# Patient Record
Sex: FEMALE | Race: WHITE | NOT HISPANIC OR LATINO | URBAN - METROPOLITAN AREA
[De-identification: names, ages, dates, MRNs, and addresses within clinical notes are randomized per-mention and may not be internally consistent; named-entity substitution may affect disease eponyms.]

---

## 2017-06-20 ENCOUNTER — INPATIENT (INPATIENT)
Facility: HOSPITAL | Age: 62
LOS: 4 days | Discharge: HOME | End: 2017-06-25
Attending: HOSPITALIST | Admitting: HOSPITALIST
Payer: COMMERCIAL

## 2017-06-20 VITALS
RESPIRATION RATE: 18 BRPM | TEMPERATURE: 98 F | HEART RATE: 72 BPM | DIASTOLIC BLOOD PRESSURE: 102 MMHG | SYSTOLIC BLOOD PRESSURE: 131 MMHG | OXYGEN SATURATION: 97 %

## 2017-06-20 DIAGNOSIS — E87.1 HYPO-OSMOLALITY AND HYPONATREMIA: ICD-10-CM

## 2017-06-20 DIAGNOSIS — E78.00 PURE HYPERCHOLESTEROLEMIA, UNSPECIFIED: ICD-10-CM

## 2017-06-20 DIAGNOSIS — Z29.9 ENCOUNTER FOR PROPHYLACTIC MEASURES, UNSPECIFIED: ICD-10-CM

## 2017-06-20 DIAGNOSIS — R60.9 EDEMA, UNSPECIFIED: ICD-10-CM

## 2017-06-20 DIAGNOSIS — R11.2 NAUSEA WITH VOMITING, UNSPECIFIED: ICD-10-CM

## 2017-06-20 DIAGNOSIS — N17.9 ACUTE KIDNEY FAILURE, UNSPECIFIED: ICD-10-CM

## 2017-06-20 LAB
ALBUMIN SERPL ELPH-MCNC: 3.4 G/DL — SIGNIFICANT CHANGE UP (ref 3.3–5)
ALP SERPL-CCNC: 63 U/L — SIGNIFICANT CHANGE UP (ref 40–120)
ALT FLD-CCNC: 57 U/L — HIGH (ref 4–33)
APPEARANCE UR: CLEAR — SIGNIFICANT CHANGE UP
AST SERPL-CCNC: 96 U/L — HIGH (ref 4–32)
B PERT DNA SPEC QL NAA+PROBE: SIGNIFICANT CHANGE UP
BASE EXCESS BLDV CALC-SCNC: -1.2 MMOL/L — SIGNIFICANT CHANGE UP
BASOPHILS # BLD AUTO: 0.03 K/UL — SIGNIFICANT CHANGE UP (ref 0–0.2)
BASOPHILS NFR BLD AUTO: 0.6 % — SIGNIFICANT CHANGE UP (ref 0–2)
BILIRUB SERPL-MCNC: 0.4 MG/DL — SIGNIFICANT CHANGE UP (ref 0.2–1.2)
BILIRUB UR-MCNC: NEGATIVE — SIGNIFICANT CHANGE UP
BLOOD GAS VENOUS - CREATININE: 1.55 MG/DL — HIGH (ref 0.5–1.3)
BLOOD UR QL VISUAL: HIGH
BUN SERPL-MCNC: 38 MG/DL — HIGH (ref 7–23)
BUN SERPL-MCNC: 40 MG/DL — HIGH (ref 7–23)
C PNEUM DNA SPEC QL NAA+PROBE: NOT DETECTED — SIGNIFICANT CHANGE UP
CALCIUM SERPL-MCNC: 8 MG/DL — LOW (ref 8.4–10.5)
CALCIUM SERPL-MCNC: 8.1 MG/DL — LOW (ref 8.4–10.5)
CHLORIDE BLDV-SCNC: 91 MMOL/L — LOW (ref 96–108)
CHLORIDE SERPL-SCNC: 87 MMOL/L — LOW (ref 98–107)
CHLORIDE SERPL-SCNC: 89 MMOL/L — LOW (ref 98–107)
CHLORIDE UR-SCNC: 11 MMOL/L — SIGNIFICANT CHANGE UP
CK MB BLD-MCNC: 3.79 NG/ML — SIGNIFICANT CHANGE UP (ref 1–4.7)
CK SERPL-CCNC: 117 U/L — SIGNIFICANT CHANGE UP (ref 25–170)
CO2 SERPL-SCNC: 19 MMOL/L — LOW (ref 22–31)
CO2 SERPL-SCNC: 21 MMOL/L — LOW (ref 22–31)
COLOR SPEC: YELLOW — SIGNIFICANT CHANGE UP
CREAT SERPL-MCNC: 1.47 MG/DL — HIGH (ref 0.5–1.3)
CREAT SERPL-MCNC: 1.51 MG/DL — HIGH (ref 0.5–1.3)
EOSINOPHIL # BLD AUTO: 0.03 K/UL — SIGNIFICANT CHANGE UP (ref 0–0.5)
EOSINOPHIL NFR BLD AUTO: 0.6 % — SIGNIFICANT CHANGE UP (ref 0–6)
FLUAV H1 2009 PAND RNA SPEC QL NAA+PROBE: NOT DETECTED — SIGNIFICANT CHANGE UP
FLUAV H1 RNA SPEC QL NAA+PROBE: NOT DETECTED — SIGNIFICANT CHANGE UP
FLUAV H3 RNA SPEC QL NAA+PROBE: NOT DETECTED — SIGNIFICANT CHANGE UP
FLUAV SUBTYP SPEC NAA+PROBE: SIGNIFICANT CHANGE UP
FLUBV RNA SPEC QL NAA+PROBE: NOT DETECTED — SIGNIFICANT CHANGE UP
GAS PNL BLDV: 121 MMOL/L — LOW (ref 136–146)
GLUCOSE BLDV-MCNC: 156 — HIGH (ref 70–99)
GLUCOSE SERPL-MCNC: 111 MG/DL — HIGH (ref 70–99)
GLUCOSE SERPL-MCNC: 156 MG/DL — HIGH (ref 70–99)
GLUCOSE UR-MCNC: NEGATIVE — SIGNIFICANT CHANGE UP
GRAN CASTS # UR COMP ASSIST: SIGNIFICANT CHANGE UP
HADV DNA SPEC QL NAA+PROBE: NOT DETECTED — SIGNIFICANT CHANGE UP
HCO3 BLDV-SCNC: 23 MMOL/L — SIGNIFICANT CHANGE UP (ref 20–27)
HCOV 229E RNA SPEC QL NAA+PROBE: NOT DETECTED — SIGNIFICANT CHANGE UP
HCOV HKU1 RNA SPEC QL NAA+PROBE: NOT DETECTED — SIGNIFICANT CHANGE UP
HCOV NL63 RNA SPEC QL NAA+PROBE: NOT DETECTED — SIGNIFICANT CHANGE UP
HCOV OC43 RNA SPEC QL NAA+PROBE: NOT DETECTED — SIGNIFICANT CHANGE UP
HCT VFR BLD CALC: 29.8 % — LOW (ref 34.5–45)
HCT VFR BLDV CALC: 32 % — LOW (ref 34.5–45)
HGB BLD-MCNC: 10.3 G/DL — LOW (ref 11.5–15.5)
HGB BLDV-MCNC: 10.3 G/DL — LOW (ref 11.5–15.5)
HMPV RNA SPEC QL NAA+PROBE: NOT DETECTED — SIGNIFICANT CHANGE UP
HPIV1 RNA SPEC QL NAA+PROBE: NOT DETECTED — SIGNIFICANT CHANGE UP
HPIV2 RNA SPEC QL NAA+PROBE: NOT DETECTED — SIGNIFICANT CHANGE UP
HPIV3 RNA SPEC QL NAA+PROBE: NOT DETECTED — SIGNIFICANT CHANGE UP
HPIV4 RNA SPEC QL NAA+PROBE: NOT DETECTED — SIGNIFICANT CHANGE UP
HYALINE CASTS # UR AUTO: SIGNIFICANT CHANGE UP (ref 0–?)
IMM GRANULOCYTES NFR BLD AUTO: 0 % — SIGNIFICANT CHANGE UP (ref 0–1.5)
KETONES UR-MCNC: NEGATIVE — SIGNIFICANT CHANGE UP
LACTATE BLDV-MCNC: 1.4 MMOL/L — SIGNIFICANT CHANGE UP (ref 0.5–2)
LEUKOCYTE ESTERASE UR-ACNC: NEGATIVE — SIGNIFICANT CHANGE UP
LYMPHOCYTES # BLD AUTO: 0.61 K/UL — LOW (ref 1–3.3)
LYMPHOCYTES # BLD AUTO: 11.3 % — LOW (ref 13–44)
M PNEUMO DNA SPEC QL NAA+PROBE: NOT DETECTED — SIGNIFICANT CHANGE UP
MCHC RBC-ENTMCNC: 30.9 PG — SIGNIFICANT CHANGE UP (ref 27–34)
MCHC RBC-ENTMCNC: 34.6 % — SIGNIFICANT CHANGE UP (ref 32–36)
MCV RBC AUTO: 89.5 FL — SIGNIFICANT CHANGE UP (ref 80–100)
MONOCYTES # BLD AUTO: 0.34 K/UL — SIGNIFICANT CHANGE UP (ref 0–0.9)
MONOCYTES NFR BLD AUTO: 6.3 % — SIGNIFICANT CHANGE UP (ref 2–14)
MUCOUS THREADS # UR AUTO: SIGNIFICANT CHANGE UP
NEUTROPHILS # BLD AUTO: 4.39 K/UL — SIGNIFICANT CHANGE UP (ref 1.8–7.4)
NEUTROPHILS NFR BLD AUTO: 81.2 % — HIGH (ref 43–77)
NITRITE UR-MCNC: NEGATIVE — SIGNIFICANT CHANGE UP
NON-SQ EPI CELLS # UR AUTO: <1 — SIGNIFICANT CHANGE UP
NT-PROBNP SERPL-SCNC: 709.8 PG/ML — SIGNIFICANT CHANGE UP
OSMOLALITY SERPL: 268 MOSMO/KG — LOW (ref 275–295)
OSMOLALITY UR: 513 MOSMO/KG — SIGNIFICANT CHANGE UP (ref 50–1200)
PCO2 BLDV: 40 MMHG — LOW (ref 41–51)
PH BLDV: 7.38 PH — SIGNIFICANT CHANGE UP (ref 7.32–7.43)
PH UR: 5.5 — SIGNIFICANT CHANGE UP (ref 4.6–8)
PLATELET # BLD AUTO: 195 K/UL — SIGNIFICANT CHANGE UP (ref 150–400)
PMV BLD: 9.2 FL — SIGNIFICANT CHANGE UP (ref 7–13)
PO2 BLDV: 39 MMHG — SIGNIFICANT CHANGE UP (ref 35–40)
POTASSIUM BLDV-SCNC: 4.8 MMOL/L — HIGH (ref 3.4–4.5)
POTASSIUM SERPL-MCNC: 5 MMOL/L — SIGNIFICANT CHANGE UP (ref 3.5–5.3)
POTASSIUM SERPL-MCNC: 5.1 MMOL/L — SIGNIFICANT CHANGE UP (ref 3.5–5.3)
POTASSIUM SERPL-SCNC: 5 MMOL/L — SIGNIFICANT CHANGE UP (ref 3.5–5.3)
POTASSIUM SERPL-SCNC: 5.1 MMOL/L — SIGNIFICANT CHANGE UP (ref 3.5–5.3)
POTASSIUM UR-SCNC: 60 MEQ/L — SIGNIFICANT CHANGE UP
PROT SERPL-MCNC: 5.9 G/DL — LOW (ref 6–8.3)
PROT UR-MCNC: 30 — HIGH
RBC # BLD: 3.33 M/UL — LOW (ref 3.8–5.2)
RBC # FLD: 12.6 % — SIGNIFICANT CHANGE UP (ref 10.3–14.5)
RBC CASTS # UR COMP ASSIST: HIGH (ref 0–?)
RSV RNA SPEC QL NAA+PROBE: NOT DETECTED — SIGNIFICANT CHANGE UP
RV+EV RNA SPEC QL NAA+PROBE: NOT DETECTED — SIGNIFICANT CHANGE UP
SAO2 % BLDV: 68.8 % — SIGNIFICANT CHANGE UP (ref 60–85)
SODIUM SERPL-SCNC: 122 MMOL/L — LOW (ref 135–145)
SODIUM SERPL-SCNC: 123 MMOL/L — LOW (ref 135–145)
SODIUM UR-SCNC: 9 MEQ/L — SIGNIFICANT CHANGE UP
SP GR SPEC: 1.02 — SIGNIFICANT CHANGE UP (ref 1–1.03)
SQUAMOUS # UR AUTO: SIGNIFICANT CHANGE UP
TROPONIN T SERPL-MCNC: < 0.06 NG/ML — SIGNIFICANT CHANGE UP (ref 0–0.06)
UROBILINOGEN FLD QL: NORMAL E.U. — SIGNIFICANT CHANGE UP (ref 0.1–0.2)
WBC # BLD: 5.4 K/UL — SIGNIFICANT CHANGE UP (ref 3.8–10.5)
WBC # FLD AUTO: 5.4 K/UL — SIGNIFICANT CHANGE UP (ref 3.8–10.5)
WBC UR QL: SIGNIFICANT CHANGE UP (ref 0–?)

## 2017-06-20 PROCEDURE — 71020: CPT | Mod: 26

## 2017-06-20 PROCEDURE — 76700 US EXAM ABDOM COMPLETE: CPT | Mod: 26

## 2017-06-20 PROCEDURE — 93010 ELECTROCARDIOGRAM REPORT: CPT

## 2017-06-20 PROCEDURE — 99254 IP/OBS CNSLTJ NEW/EST MOD 60: CPT | Mod: GC

## 2017-06-20 PROCEDURE — 99223 1ST HOSP IP/OBS HIGH 75: CPT | Mod: AI

## 2017-06-20 RX ORDER — SODIUM CHLORIDE 9 MG/ML
1000 INJECTION INTRAMUSCULAR; INTRAVENOUS; SUBCUTANEOUS
Qty: 0 | Refills: 0 | Status: DISCONTINUED | OUTPATIENT
Start: 2017-06-20 | End: 2017-06-20

## 2017-06-20 RX ORDER — HEPARIN SODIUM 5000 [USP'U]/ML
5000 INJECTION INTRAVENOUS; SUBCUTANEOUS EVERY 12 HOURS
Qty: 0 | Refills: 0 | Status: DISCONTINUED | OUTPATIENT
Start: 2017-06-20 | End: 2017-06-25

## 2017-06-20 RX ORDER — ONDANSETRON 8 MG/1
4 TABLET, FILM COATED ORAL EVERY 8 HOURS
Qty: 0 | Refills: 0 | Status: DISCONTINUED | OUTPATIENT
Start: 2017-06-20 | End: 2017-06-25

## 2017-06-20 RX ORDER — ACETAMINOPHEN 500 MG
650 TABLET ORAL EVERY 6 HOURS
Qty: 0 | Refills: 0 | Status: DISCONTINUED | OUTPATIENT
Start: 2017-06-20 | End: 2017-06-25

## 2017-06-20 RX ORDER — ONDANSETRON 8 MG/1
4 TABLET, FILM COATED ORAL ONCE
Qty: 0 | Refills: 0 | Status: COMPLETED | OUTPATIENT
Start: 2017-06-20 | End: 2017-06-20

## 2017-06-20 RX ORDER — SODIUM CHLORIDE 9 MG/ML
1000 INJECTION INTRAMUSCULAR; INTRAVENOUS; SUBCUTANEOUS ONCE
Qty: 0 | Refills: 0 | Status: COMPLETED | OUTPATIENT
Start: 2017-06-20 | End: 2017-06-20

## 2017-06-20 RX ORDER — FUROSEMIDE 40 MG
20 TABLET ORAL ONCE
Qty: 0 | Refills: 0 | Status: COMPLETED | OUTPATIENT
Start: 2017-06-20 | End: 2017-06-20

## 2017-06-20 RX ORDER — METOCLOPRAMIDE HCL 10 MG
10 TABLET ORAL ONCE
Qty: 0 | Refills: 0 | Status: COMPLETED | OUTPATIENT
Start: 2017-06-20 | End: 2017-06-20

## 2017-06-20 RX ORDER — METOCLOPRAMIDE HCL 10 MG
10 TABLET ORAL EVERY 6 HOURS
Qty: 0 | Refills: 0 | Status: DISCONTINUED | OUTPATIENT
Start: 2017-06-20 | End: 2017-06-20

## 2017-06-20 RX ADMIN — Medication 20 MILLIGRAM(S): at 19:02

## 2017-06-20 RX ADMIN — Medication 10 MILLIGRAM(S): at 11:11

## 2017-06-20 RX ADMIN — Medication 650 MILLIGRAM(S): at 19:05

## 2017-06-20 RX ADMIN — ONDANSETRON 4 MILLIGRAM(S): 8 TABLET, FILM COATED ORAL at 19:02

## 2017-06-20 RX ADMIN — HEPARIN SODIUM 5000 UNIT(S): 5000 INJECTION INTRAVENOUS; SUBCUTANEOUS at 18:07

## 2017-06-20 RX ADMIN — SODIUM CHLORIDE 75 MILLILITER(S): 9 INJECTION INTRAMUSCULAR; INTRAVENOUS; SUBCUTANEOUS at 10:07

## 2017-06-20 RX ADMIN — ONDANSETRON 4 MILLIGRAM(S): 8 TABLET, FILM COATED ORAL at 13:31

## 2017-06-20 RX ADMIN — SODIUM CHLORIDE 1000 MILLILITER(S): 9 INJECTION INTRAMUSCULAR; INTRAVENOUS; SUBCUTANEOUS at 03:03

## 2017-06-20 RX ADMIN — Medication 650 MILLIGRAM(S): at 20:00

## 2017-06-20 RX ADMIN — Medication 10 MILLIGRAM(S): at 03:03

## 2017-06-20 NOTE — H&P ADULT - ASSESSMENT
63 yo F PMH HLD (on diet control), presented for nausea and vomiting for 2 days, reported decrease urinary out put.

## 2017-06-20 NOTE — H&P ADULT - PROBLEM SELECTOR PLAN 1
UA urine lytes/osmolarity pending  recently poor oral intake, clinically favors hypotonic hyponatremia, will empirically IVF follow up repeat BMP,   CXR no pul edema, proBNP non significant according to Pt age, CHF exacerbation less likely.

## 2017-06-20 NOTE — H&P ADULT - NSHPLABSRESULTS_GEN_ALL_CORE
LABS:                        10.3   5.40  )-----------( 195      ( 20 Jun 2017 03:00 )             29.8     06-20    123<L>  |  87<L>  |  40<H>  ----------------------------<  156<H>  5.0   |  19<L>  |  1.51<H>    Ca    8.1<L>      20 Jun 2017 03:00    TPro  5.9<L>  /  Alb  3.4  /  TBili  0.4  /  DBili  x   /  AST  96<H>  /  ALT  57<H>  /  AlkPhos  63  06-20      CARDIAC MARKERS ( 20 Jun 2017 03:00 )  x     / < 0.06 ng/mL / 117 u/L / 3.79 ng/mL / x              EKG: ordered    RADIOLOGY & ADDITIONAL TESTS:    Imaging Personally Reviewed:    IMPRESSION:   cxr: Left lower lobe atelectasis and/or pneumonia

## 2017-06-20 NOTE — ED PROVIDER NOTE - MEDICAL DECISION MAKING DETAILS
63 y/o female with mult med complaints:  n/v x2, ha, dec urine output, leg swelling.  Concerning for dehydration leading to kidney injury.  Consider new chf though unlikely as clear lung exam, no ascites, no cp.  Consider hypoalbuminemia.  Obtain cbc, cmp, bnp, ce's, cxr, ekg, give ivf bolus as clinically dry, reassess. 63 y/o female with mult med complaints:  n/v x2, ha, dec urine output, leg swelling.  Concerning for dehydration leading to kidney injury.  Consider new chf though unlikely as clear lung exam, no ascites, no cp.  Consider hypoalbuminemia.  Cyanide toxicity via apricot seeds a possibility, will check lactate.  Obtain cbc, cmp, bnp, ce's, vbg, cxr, ekg, give ivf bolus as clinically dry, reassess. 63 y/o female with mult med complaints:  n/v x2, ha, dec urine output, leg swelling.  Concerning for dehydration leading to kidney injury.  Consider new chf though unlikely as clear lung exam, no ascites, no cp.  Consider hypoalbuminemia.  Cyanide toxicity via apricot seeds a possibility though unlikely given small quantity ingested, will check lactate.  Obtain cbc, cmp, bnp, ce's, vbg, cxr, ekg, give ivf bolus as clinically dry, reassess.

## 2017-06-20 NOTE — CHART NOTE - NSCHARTNOTEFT_GEN_A_CORE
D/w Toxicology regarding need for consult as pt had apricot pits 3 of them daily x 3 days.  Cyanide serum sent. Pt unlikely has cyanide poisioning as pts lactate is WNL.  No official need for toxicology consult at this time.  Will follow cyanide serum. D/w medical attending.

## 2017-06-20 NOTE — PATIENT PROFILE ADULT. - REASON FOR ADMISSION
Legs started welling Friday night, realized Monday that they were worse and nausea, referred by urgent care for possible kidney issues, R sided flank pain

## 2017-06-20 NOTE — CONSULT NOTE ADULT - ATTENDING COMMENTS
likely ATN in the setting of NSAIDs and decreased intake with subsequent Hyponatremia(multifactorial).  would limit water and hypotonic fluids to no more 1-1.5 liters. stop IVF given hypervolemic.  would give gentle diuresis

## 2017-06-20 NOTE — H&P ADULT - PROBLEM SELECTOR PLAN 6
symptomatic support, clear liquid diet, reglan and zofran PRN  IVR  trending LFT  suspect viral syndrome, RVP pending, f/u parvo virus IgM/IgG  Cyanide toxicity via apricot seeds a possibility, lactate WNL, f/u cyanide level.

## 2017-06-20 NOTE — CONSULT NOTE ADULT - SUBJECTIVE AND OBJECTIVE BOX
[facility] DIVISION OF KIDNEY DISEASES AND HYPERTENSION -- INITIAL CONSULT NOTE  --------------------------------------------------------------------------------  HPI:  A 62 year old Female with PMH HLD( not on medication) who presented with headaches, nausea and vomiting for  the last 4 days and decreased urine output. Pt reported since Saturday reported started having nausea and vomiting consequent has decreased PO intake of solids but no liquids. Pt report was able tolerate fluids but no solid. Pt report that felt sick with subjective fever and chills.Pt also reports she has some hoarseness on her voices since then.  Pt report taking motrin for her headaches the last few days.  She tried to drink more fluid, but found her legs got swollen more  and had a weight gain of 13 pounds. Pt also reported taking apricot kernels the last 3 days. In the ER labs showed hyponatremia (Na 123) with ETHEL SCr 1.5. s/p 1L NS and on the floor continued with NS 0.9  75 cc per hour with clear liquid. Serum sodium decreased to 122. Pt and nurse reports currently worsening lower extremity edema the last 24 hour.Pt still having nausea and headache but improves with antiemetic medication and Tylenol. Pt report her grandson was sick 2weeks ago. Pt reports that  had a rash that went away byitself            PAST HISTORY  --------------------------------------------------------------------------------  PAST MEDICAL & SURGICAL HISTORY:  Hypercholesteremia  No significant past surgical history    FAMILY HISTORY:  No pertinent family history in first degree relatives    PAST SOCIAL HISTORY:    ALLERGIES & MEDICATIONS  --------------------------------------------------------------------------------  Allergies    No Known Allergies    Intolerances      Standing Inpatient Medications  heparin  Injectable 5000Unit(s) SubCutaneous every 12 hours    PRN Inpatient Medications  ondansetron Injectable 4milliGRAM(s) IV Push every 8 hours PRN  acetaminophen   Tablet. 650milliGRAM(s) Oral every 6 hours PRN      REVIEW OF SYSTEMS  --------------------------------------------------------------------------------  Gen: No weight changes, fatigue, fevers/chills, weakness  Skin: No rashes  Respiratory: No dyspnea, cough, wheezing, hemoptysis  CV: No chest pain, PND, orthopnea  GI: No abdominal pain, diarrhea, constipation, nausea, vomiting, melena, hematochezia  : No increased frequency, dysuria, hematuria, nocturia  MSK: No joint pain/swelling; no back pain; no edema  Neuro: No dizziness/lightheadedness, weakness  Heme: No easy bruising or bleeding      All other systems were reviewed and are negative, except as noted.    VITALS/PHYSICAL EXAM  --------------------------------------------------------------------------------  T(C): 36.7, Max: 36.7 (06-20 @ 03:33)  HR: 65 (65 - 78)  BP: 122/70 (122/70 - 145/77)  RR: 18 (17 - 18)  SpO2: 100% (97% - 100%)  Wt(kg): --  Height (cm): 154.9 (06-20-17 @ 09:48)  Weight (kg): 63.3 (06-20-17 @ 09:48)  BMI (kg/m2): 26.4 (06-20-17 @ 09:48)  BSA (m2): 1.62 (06-20-17 @ 09:48)      I & Os for current day (as of 06-20-17 @ 19:40)  =============================================  IN: 1074 ml / OUT: 620 ml / NET: 454 ml    Physical Exam:  	Gen: NAD, well-appearing  	HEENT: PERRL, supple neck, MMM, clear oropharynx  	Pulm: Decreased breath sounds on the bases  	CV: RRR, S1S2; no rub  	Back: No spinal or CVA tenderness; no sacral edema  	Abd: +BS, soft, nontender/nondistended  	: No suprapubic tenderness  	LE: Warm,B/L LE pitting edema ++              Skin: Warm, without rashes    LABS/STUDIES  --------------------------------------------------------------------------------              10.3   5.40  >-----------<  195      [06-20-17 @ 03:00]              29.8     122  |  89  |  38  ----------------------------<  111      [06-20-17 @ 15:42]  5.1   |  21  |  1.47        Ca     8.0     [06-20-17 @ 15:42]    TPro  5.9  /  Alb  3.4  /  TBili  0.4  /  DBili  x   /  AST  96  /  ALT  57  /  AlkPhos  63  [06-20-17 @ 03:00]        Troponin < 0.06      [06-20-17 @ 03:00]        [06-20-17 @ 03:00]  Serum Osmolality 268      [06-20-17 @ 03:00]    Creatinine Trend:  SCr 1.47 [06-20 @ 15:42]  SCr 1.51 [06-20 @ 03:00]    Urinalysis - [06-20-17 @ 13:25]      Color YELLOW / Appearance CLEAR / SG 1.017 / pH 5.5      Gluc NEGATIVE / Ketone NEGATIVE  / Bili NEGATIVE / Urobili NORMAL       Blood SMALL / Protein 30 / Leuk Est NEGATIVE / Nitrite NEGATIVE      RBC 5-10 / WBC 2-5 / Hyaline 2-5 / Gran 2-5 / Sq Epi OCC / Non Sq Epi  / Bacteria     Urine Sodium 9      [06-20-17 @ 13:25]  Urine Potassium 60.0      [06-20-17 @ 13:25]  Urine Chloride 11      [06-20-17 @ 13:25]  Urine Osmolality 513      [06-20-17 @ 13:25]

## 2017-06-20 NOTE — H&P ADULT - HISTORY OF PRESENT ILLNESS
61 yo F PMH HLD (on diet control), presented for nausea and vomiting for 2 days, reported decrease urinary out put. Pt reported her grandson got "fifth disease" 2-3 weeks ago. She felt sick ever since then and it got worse. She felt febrile briefly, never took temperature, "lost voice", also c/o nausea/vominting, no appetite, decreased oral intake. She tried to drink more fluid, but found her legs got swollen, self reported "weight gain 10 lb" in the past 2 weeks. Otherwise, pt denied SOB, no CP, no cough, no abd pain, no dysuria, no palpitations, no rash, denied joint pain  In ER, Pt was found afebrile, vitals stable. Lab showed hyponatremia (Na 123) with ETHEL. s/p 1L NS 63 yo F PMH HLD (on diet control), presented for nausea and vomiting for 2 days, reported decrease urinary out put. Pt reported her grandson got "fifth disease" 2-3 weeks ago. She felt sick ever since then and it got worse. She felt febrile briefly, never took temperature, "lost voice", also c/o nausea/vominting, no appetite, decreased oral intake. She tried to drink more fluid, but found her legs got swollen, self reported "weight gain 10 lb" in the past 2 weeks. Pt reported taking apricot kernels, and concerned about cyanide poisoning. daughter reported friend had similar symptoms.  Otherwise, pt denied SOB, no CP, no cough, no abd pain, no dysuria, no palpitations, no rash, denied joint pain  In ER, Pt was found afebrile, vitals stable. Lab showed hyponatremia (Na 123) with ETHEL. s/p 1L NS 61 yo F PMH HLD (on diet control), presented for nausea and vomiting for 2 days, reported decrease urinary out put. Pt reported her grandson got "fifth disease" 2-3 weeks ago. She felt sick ever since then and it got worse. She felt febrile briefly, never took temperature, "lost voice", finished 5 days course of Abx from PCP. She also c/o nausea/vominting, no appetite, decreased oral intake. She tried to drink more fluid, but found her legs got swollen, self reported "weight gain 10 lb" in the past 2 weeks. Pt reported taking apricot kernels, and concerned about cyanide poisoning. daughter reported friend had similar symptoms.  Otherwise, pt denied SOB, no CP, no cough, no abd pain, no dysuria, no palpitations, no rash, denied joint pain  In ER, Pt was found afebrile, vitals stable. Lab showed hyponatremia (Na 123) with ETHEL. s/p 1L NS

## 2017-06-20 NOTE — ED ADULT NURSE NOTE - OBJECTIVE STATEMENT
pt arrives a&ox3 ambulatory to room 18 c/o nausea and leg swelling for past several days. denies chest pain, sob, fevers, no vomiting. nad noted. vss. resps even and unlabored. 20g iv access obtained labs drawn and sent. will ctm.

## 2017-06-20 NOTE — ED PROVIDER NOTE - OBJECTIVE STATEMENT
63 y/o female h/o hld, appy here with n/v x2 today, dec urine output x5 days, mild frontal ha similar in nature to prior mckinnon's.  Pt states that she had a viral syndrome with uri sx about 2 weeks ago, sick contact in family member with same sx.  States she has felt gradually unwell over past few days.  Feels as though she has inc leg swelling b/l as well. 63 y/o female h/o hld, appy here with n/v x2 today, dec urine output x5 days, mild frontal ha similar in nature to prior mckinnon's.  Pt states that she had a viral syndrome with uri sx about 2 weeks ago, sick contact in family member with same sx.  States she has felt gradually unwell over past few days.  Feels as though she has inc leg swelling b/l as well.  Pt admits to eating apricot seeds at the behest of a friend for health benefits.  Ate 3 kernels over 3 days about 1 week ago, concerned about adverse effects from the seeds.

## 2017-06-20 NOTE — CONSULT NOTE ADULT - ASSESSMENT
A 62 year old Female with PMH HLD( not on medication) who presented with headaches, nausea and vomiting for  the last 4 days  found to have hyponatremia and jerome.

## 2017-06-20 NOTE — CONSULT NOTE ADULT - PROBLEM SELECTOR RECOMMENDATION 2
Multifactorial: In the setting poor solute intake, hypotonic intake and stimulation of ADH (headache and N/V). Pt s/p IV fluids with worsening serum sodium and edema. Consider to hold IVF. Fluid restriction less than 1.5 L per day. Change the diet from clear liquids  to one that doesnot has less fluid as she tolerated. Management of nausea and headache as per medical team. Send U Na and Uosm  today. Check Serum Sodium every 8  hours. Goal correction is 6-8 meq in a 24 hours .           Discussed with ADS team

## 2017-06-20 NOTE — CONSULT NOTE ADULT - PROBLEM SELECTOR RECOMMENDATION 9
In the setting NSAID exposure, hypovolemia, GI losses and ? viral infection most likely hemodynamically mediated. Pt s/p IVF with minimal improvement of the serum creatinine. Pt currently had worsening lower extremity edema. Consider hold IV fluids. Most likely ATN currently. Other on the differential AIN ? from NSAID and recent exposure to Abx with a recent rash. Renal Us showed no obstructive nephropathy  Strict I and Os. Avoid nephrotoxic, NSAIDs And RCAs

## 2017-06-20 NOTE — ED ADULT TRIAGE NOTE - CHIEF COMPLAINT QUOTE
pt. c/o nausea, 2 episodes of vomiting, HA and decreased urine output x 5 days, went to urgent care, UA showed evidence of proteinuria.  Edema noted to both of pt.'s lower extremities.  PMHx HDL.

## 2017-06-20 NOTE — H&P ADULT - NSHPPHYSICALEXAM_GEN_ALL_CORE
Vital Signs Last 24 Hrs  T(C): 36.7, Max: 36.7 (06-20 @ 03:33)  HR: 78 (72 - 78)  BP: 139/89 (131/102 - 145/77)  RR: 17 (17 - 18)  SpO2: 99% (97% - 99%)  Wt(kg): --  CAPILLARY BLOOD GLUCOSE    I&O's Summary      PHYSICAL EXAM:  GENERAL: NAD, well-developed  HEAD:  Atraumatic, Normocephalic  EYES: EOMI, PERRLA, conjunctiva and sclera clear  NECK: Supple, No JVD  CHEST/LUNG: Clear to auscultation bilaterally; No wheeze  HEART: Regular rate and rhythm; No murmurs, rubs, or gallops  ABDOMEN: Soft, Nontender, Nondistended; Bowel sounds present  EXTREMITIES:  mild non-pitting edema b/l LE, no calf tenderness, no erythema or warmth, 2+ Peripheral Pulses, No clubbing, cyanosis.  PSYCH: AAOx3  NEUROLOGY: non-focal  SKIN: No rashes or lesions

## 2017-06-21 LAB
ALBUMIN SERPL ELPH-MCNC: 2.9 G/DL — LOW (ref 3.3–5)
ALP SERPL-CCNC: 59 U/L — SIGNIFICANT CHANGE UP (ref 40–120)
ALT FLD-CCNC: 40 U/L — HIGH (ref 4–33)
AST SERPL-CCNC: 46 U/L — HIGH (ref 4–32)
B19V IGG SER QL: POSITIVE — SIGNIFICANT CHANGE UP
B19V IGG SER-ACNC: 5 INDEX — HIGH (ref 0–0.8)
B19V IGM FLD-ACNC: 5.7 INDEX — HIGH (ref 0–0.8)
B19V IGM SER-ACNC: POSITIVE — SIGNIFICANT CHANGE UP
BILIRUB SERPL-MCNC: 0.4 MG/DL — SIGNIFICANT CHANGE UP (ref 0.2–1.2)
BUN SERPL-MCNC: 40 MG/DL — HIGH (ref 7–23)
BUN SERPL-MCNC: 41 MG/DL — HIGH (ref 7–23)
BUN SERPL-MCNC: 41 MG/DL — HIGH (ref 7–23)
C3 SERPL-MCNC: 48.5 MG/DL — LOW (ref 90–180)
C4 SERPL-MCNC: 1.5 MG/DL — LOW (ref 10–40)
CALCIUM SERPL-MCNC: 7.9 MG/DL — LOW (ref 8.4–10.5)
CALCIUM SERPL-MCNC: 8.1 MG/DL — LOW (ref 8.4–10.5)
CALCIUM SERPL-MCNC: 8.3 MG/DL — LOW (ref 8.4–10.5)
CHLORIDE SERPL-SCNC: 88 MMOL/L — LOW (ref 98–107)
CHLORIDE SERPL-SCNC: 89 MMOL/L — LOW (ref 98–107)
CHLORIDE SERPL-SCNC: 90 MMOL/L — LOW (ref 98–107)
CHOLEST SERPL-MCNC: 161 MG/DL — SIGNIFICANT CHANGE UP (ref 120–199)
CO2 SERPL-SCNC: 19 MMOL/L — LOW (ref 22–31)
CO2 SERPL-SCNC: 20 MMOL/L — LOW (ref 22–31)
CO2 SERPL-SCNC: 20 MMOL/L — LOW (ref 22–31)
CREAT ?TM UR-MCNC: 175.45 MG/DL — SIGNIFICANT CHANGE UP
CREAT SERPL-MCNC: 1.54 MG/DL — HIGH (ref 0.5–1.3)
CREAT SERPL-MCNC: 1.6 MG/DL — HIGH (ref 0.5–1.3)
CREAT SERPL-MCNC: 1.81 MG/DL — HIGH (ref 0.5–1.3)
GLUCOSE SERPL-MCNC: 110 MG/DL — HIGH (ref 70–99)
GLUCOSE SERPL-MCNC: 122 MG/DL — HIGH (ref 70–99)
GLUCOSE SERPL-MCNC: 96 MG/DL — SIGNIFICANT CHANGE UP (ref 70–99)
HAPTOGLOB SERPL-MCNC: 210 MG/DL — HIGH (ref 34–200)
HBA1C BLD-MCNC: 5.8 % — HIGH (ref 4–5.6)
HCT VFR BLD CALC: 27.3 % — LOW (ref 34.5–45)
HDLC SERPL-MCNC: 23 MG/DL — LOW (ref 45–65)
HGB BLD-MCNC: 9.5 G/DL — LOW (ref 11.5–15.5)
LDH SERPL L TO P-CCNC: 166 U/L — SIGNIFICANT CHANGE UP (ref 135–225)
LIPID PNL WITH DIRECT LDL SERPL: 108 MG/DL — SIGNIFICANT CHANGE UP
MCHC RBC-ENTMCNC: 30.8 PG — SIGNIFICANT CHANGE UP (ref 27–34)
MCHC RBC-ENTMCNC: 34.8 % — SIGNIFICANT CHANGE UP (ref 32–36)
MCV RBC AUTO: 88.6 FL — SIGNIFICANT CHANGE UP (ref 80–100)
PLATELET # BLD AUTO: 183 K/UL — SIGNIFICANT CHANGE UP (ref 150–400)
PMV BLD: 8.9 FL — SIGNIFICANT CHANGE UP (ref 7–13)
POTASSIUM SERPL-MCNC: 4.6 MMOL/L — SIGNIFICANT CHANGE UP (ref 3.5–5.3)
POTASSIUM SERPL-MCNC: 4.9 MMOL/L — SIGNIFICANT CHANGE UP (ref 3.5–5.3)
POTASSIUM SERPL-MCNC: 5.1 MMOL/L — SIGNIFICANT CHANGE UP (ref 3.5–5.3)
POTASSIUM SERPL-SCNC: 4.6 MMOL/L — SIGNIFICANT CHANGE UP (ref 3.5–5.3)
POTASSIUM SERPL-SCNC: 4.9 MMOL/L — SIGNIFICANT CHANGE UP (ref 3.5–5.3)
POTASSIUM SERPL-SCNC: 5.1 MMOL/L — SIGNIFICANT CHANGE UP (ref 3.5–5.3)
PROT SERPL-MCNC: 5.1 G/DL — LOW (ref 6–8.3)
PROT UR-MCNC: 20.2 MG/DL — SIGNIFICANT CHANGE UP
RBC # BLD: 3.08 M/UL — LOW (ref 3.8–5.2)
RBC # FLD: 12.6 % — SIGNIFICANT CHANGE UP (ref 10.3–14.5)
RHEUMATOID FACT SERPL-ACNC: 8.1 IU/ML — SIGNIFICANT CHANGE UP
SODIUM SERPL-SCNC: 121 MMOL/L — LOW (ref 135–145)
SODIUM SERPL-SCNC: 122 MMOL/L — LOW (ref 135–145)
SODIUM SERPL-SCNC: 123 MMOL/L — LOW (ref 135–145)
TRIGL SERPL-MCNC: 178 MG/DL — HIGH (ref 10–149)
TSH SERPL-MCNC: 2.74 UIU/ML — SIGNIFICANT CHANGE UP (ref 0.27–4.2)
WBC # BLD: 4.55 K/UL — SIGNIFICANT CHANGE UP (ref 3.8–10.5)
WBC # FLD AUTO: 4.55 K/UL — SIGNIFICANT CHANGE UP (ref 3.8–10.5)

## 2017-06-21 PROCEDURE — 70450 CT HEAD/BRAIN W/O DYE: CPT | Mod: 26

## 2017-06-21 PROCEDURE — 99233 SBSQ HOSP IP/OBS HIGH 50: CPT | Mod: GC

## 2017-06-21 PROCEDURE — 99233 SBSQ HOSP IP/OBS HIGH 50: CPT

## 2017-06-21 RX ORDER — SODIUM CHLORIDE 5 G/100ML
500 INJECTION, SOLUTION INTRAVENOUS
Qty: 0 | Refills: 0 | Status: DISCONTINUED | OUTPATIENT
Start: 2017-06-21 | End: 2017-06-22

## 2017-06-21 RX ADMIN — SODIUM CHLORIDE 50 MILLILITER(S): 5 INJECTION, SOLUTION INTRAVENOUS at 17:41

## 2017-06-21 RX ADMIN — SODIUM CHLORIDE 75 MILLILITER(S): 5 INJECTION, SOLUTION INTRAVENOUS at 23:35

## 2017-06-21 RX ADMIN — HEPARIN SODIUM 5000 UNIT(S): 5000 INJECTION INTRAVENOUS; SUBCUTANEOUS at 05:50

## 2017-06-21 RX ADMIN — Medication 650 MILLIGRAM(S): at 20:35

## 2017-06-21 RX ADMIN — Medication 650 MILLIGRAM(S): at 21:15

## 2017-06-21 NOTE — PROGRESS NOTE ADULT - PROBLEM SELECTOR PLAN 6
symptomatic support, clear liquid diet, reglan and zofran PRN  IVR  trending LFT  suspect viral syndrome, RVP pending, f/u parvo virus IgM/IgG  Cyanide toxicity via apricot seeds a possibility, lactate WNL, f/u cyanide level. heparin SQ

## 2017-06-21 NOTE — PROGRESS NOTE ADULT - SUBJECTIVE AND OBJECTIVE BOX
Patient is a 61 yo  Female who presents with a chief complaint of N/V hyponatremia    SUBJECTIVE / OVERNIGHT EVENTS:    Patient states feeling a little better, less swollen; states antiemetic help nausea and was able to tolerate a bit of chicken soup.  Had normal BM this am, no diarrhea.  No respiratory complaints.  No fever, no chills.  Patient denies h/o renal disease or other medical problems, felt sick 3 wks ago and then given azithro x 5 days.     MEDICATIONS  (STANDING):  heparin  Injectable 5000Unit(s) SubCutaneous every 12 hours    MEDICATIONS  (PRN):  ondansetron Injectable 4milliGRAM(s) IV Push every 8 hours PRN Nausea and/or Vomiting  acetaminophen   Tablet. 650milliGRAM(s) Oral every 6 hours PRN Mild Pain (1 - 3)      Vital Signs Last 24 Hrs  T(C): 36.8, Max: 36.8 (06-21 @ 13:33)  HR: 62 (60 - 69)  BP: 120/66 (110/58 - 122/70)  RR: 16 (16 - 18)  SpO2: 100% (99% - 100%)  Wt(kg): --    I&O's Summary    I & Os for current day (as of 2017 14:31)  =============================================  IN: 1224 ml / OUT: 1170 ml / NET: 54 ml      PHYSICAL EXAM:  GENERAL: NAD  HEAD:  Atraumatic, Normocephalic  EYES: sclera appear mildl icteric, EOMI, b/l subjunctional hemorrhages   NECK: Supple, No JVD  CHEST/LUNG: Clear to auscultation bilaterally; No wheeze  HEART: Regular rate and rhythm; No murmurs, rubs, or gallops  ABDOMEN: mild distention, no r/g, soft   EXTREMITIES:   wwp, trace pedal edema   PSYCH: AAOx3  NEUROLOGY: non-focal    LABS:                        10.3   5.40  )-----------( 195      ( 2017 03:00 )             29.8     06-21    123<L>  |  90<L>  |  41<H>  ----------------------------<  96  4.6   |  20<L>  |  1.81<H>    Ca    8.1<L>      2017 06:00    TPro  5.1<L>  /  Alb  2.9<L>  /  TBili  0.4  /  DBili  x   /  AST  46<H>  /  ALT  40<H>  /  AlkPhos  59  06-21      CARDIAC MARKERS ( 2017 03:00 )  x     / < 0.06 ng/mL / 117 u/L / 3.79 ng/mL / x          Urinalysis Basic - ( 2017 13:25 )    Color: YELLOW / Appearance: CLEAR / S.017 / pH: 5.5  Gluc: NEGATIVE / Ketone: NEGATIVE  / Bili: NEGATIVE / Urobili: NORMAL E.U.   Blood: SMALL / Protein: 30 / Nitrite: NEGATIVE   Leuk Esterase: NEGATIVE / RBC: 5-10 / WBC 2-5   Sq Epi: OCC / Non Sq Epi: x / Bacteria: x        Microbiology:   CHELSEA  @ 03:00  pH: 7.38/pCO2: 40/pO2: 39/HCO3: 23/lactate: 1.4      RADIOLOGY & ADDITIONAL TESTS:    Imaging Personally Reviewed:  CXR no infiltrate, abdominal US no hydro, no biliary pathology     Consultant(s) Notes Reviewed: renal     Care Discussed with Consultants/Other Providers: renal, PCP office for labs Patient is a 63 yo  Female who presents with a chief complaint of N/V hyponatremia    SUBJECTIVE / OVERNIGHT EVENTS:    Patient states feeling a little better, less swollen; states antiemetic help nausea and was able to tolerate a bit of chicken soup.  Had normal BM this am, no diarrhea.  No respiratory complaints.  No fever, no chills.  Patient denies h/o renal disease or other medical problems, felt sick 3 wks ago and then given azithro x 5 days.     MEDICATIONS  (STANDING):  heparin  Injectable 5000Unit(s) SubCutaneous every 12 hours    MEDICATIONS  (PRN):  ondansetron Injectable 4milliGRAM(s) IV Push every 8 hours PRN Nausea and/or Vomiting  acetaminophen   Tablet. 650milliGRAM(s) Oral every 6 hours PRN Mild Pain (1 - 3)      Vital Signs Last 24 Hrs  T(C): 36.8, Max: 36.8 (06-21 @ 13:33)  HR: 62 (60 - 69)  BP: 120/66 (110/58 - 122/70)  RR: 16 (16 - 18)  SpO2: 100% (99% - 100%)  Wt(kg): --    I&O's Summary    I & Os for current day (as of 2017 14:31)  =============================================  IN: 1224 ml / OUT: 1170 ml / NET: 54 ml      PHYSICAL EXAM:  GENERAL: NAD  HEAD:  Atraumatic, Normocephalic  EYES: sclera appear mildly icteric/yellow (however TB wnl), EOMI, b/l subjunctional hemorrhages   NECK: Supple, No JVD  CHEST/LUNG: Clear to auscultation bilaterally; No wheeze  HEART: Regular rate and rhythm; No murmurs, rubs, or gallops  ABDOMEN: mild distention, no r/g, soft   EXTREMITIES:   wwp, trace pedal edema   PSYCH: AAOx3  NEUROLOGY: non-focal    LABS:                        10.3   5.40  )-----------( 195      ( 2017 03:00 )             29.8     06-21    123<L>  |  90<L>  |  41<H>  ----------------------------<  96  4.6   |  20<L>  |  1.81<H>    Ca    8.1<L>      2017 06:00    TPro  5.1<L>  /  Alb  2.9<L>  /  TBili  0.4  /  DBili  x   /  AST  46<H>  /  ALT  40<H>  /  AlkPhos  59  06-      CARDIAC MARKERS ( 2017 03:00 )  x     / < 0.06 ng/mL / 117 u/L / 3.79 ng/mL / x          Urinalysis Basic - ( 2017 13:25 )    Color: YELLOW / Appearance: CLEAR / S.017 / pH: 5.5  Gluc: NEGATIVE / Ketone: NEGATIVE  / Bili: NEGATIVE / Urobili: NORMAL E.U.   Blood: SMALL / Protein: 30 / Nitrite: NEGATIVE   Leuk Esterase: NEGATIVE / RBC: 5-10 / WBC 2-5   Sq Epi: OCC / Non Sq Epi: x / Bacteria: x        Microbiology:   VBG  @ 03:00  pH: 7.38/pCO2: 40/pO2: 39/HCO3: 23/lactate: 1.4      RADIOLOGY & ADDITIONAL TESTS:    Imaging Personally Reviewed:  CXR no infiltrate, abdominal US no hydro, no biliary pathology     Consultant(s) Notes Reviewed: renal     Care Discussed with Consultants/Other Providers: renal, PCP office for labs

## 2017-06-21 NOTE — PROGRESS NOTE ADULT - ASSESSMENT
61 yo F PMH HLD presented for severe nausea/vomiting x2d with associated decreased UOP and swelling noted to be hyponatremia and in ETHEL.

## 2017-06-21 NOTE — PROGRESS NOTE ADULT - PROBLEM SELECTOR PLAN 1
unclear insult to kidney, appears to be intrinsic, no fluid or diuretic responsive, no obstruction, got labs faxed from PCP and Cr 6/2 was 0.9 with sodium of 134  -BRIDGER, DS, C3/C4, GM Ab, ANCA, hepatitis panel  -no diarrhea to suggest HUS/TTP (plt wnl), check LDH/hapto, consider heme eval  -?renal bx, will f/u with renal re: recs unclear insult to kidney, appears to be intrinsic, no fluid or diuretic responsive, no obstruction, got labs faxed from PCP and Cr 6/2 was 0.9 with sodium of 134  -BRIDGER, DS, C3/C4, GM Ab, ANCA, hepatitis panel  -no diarrhea to suggest HUS/TTP (plt wnl), check LDH/hapto, consider heme eval  -?renal bx, will f/u with renal re: recs  - 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 unclear insult to kidney, appears to be intrinsic, not fluid or diuretic responsive in regards to Cr elevation, no obstruction, got labs faxed from PCP and Cr 6/2 was 0.9 with sodium of 134  -BRIDGER, DS, C3/C4, GM Ab, ANCA, hepatitis panel  -no diarrhea to suggest HUS/TTP (plt wnl), check LDH/hapto, consider heme eval  -?renal bx, will f/u with renal re: recs  - 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

## 2017-06-21 NOTE — PROGRESS NOTE ADULT - PROBLEM SELECTOR PLAN 2
hypervolemic hyponatremia, nonoligouric renal failure  on fluid restriction hypervolemic hyponatremia, nonoligouric renal failure  on fluid restriction  f/u renal recs

## 2017-06-21 NOTE — PROGRESS NOTE ADULT - PROBLEM SELECTOR PROBLEM 6
Nausea and vomiting, intractability of vomiting not specified, unspecified vomiting type Prophylactic measure

## 2017-06-21 NOTE — CHART NOTE - NSCHARTNOTEFT_GEN_A_CORE
Repeat Na: 122 (up from 121 7 hours prior). Not overcorrecting. Will increase 1.5% from 50cc/hr to 75cc/hr and check UOsm w/ next BMP - d/w Renal Fellow on Call

## 2017-06-21 NOTE — PROGRESS NOTE ADULT - PROBLEM SELECTOR PLAN 3
on no meds not c/w, CHF, low albumin w/o nephrotic range proteinuria on UA however suspect low protein state resonsible for swelling secondary to renal failure of unclear etiology but suspect post infectious intrinsic glomerular process, CXR not c/w CHF, BNP in range  -edema improved with lasix x 1  -f/u renal recs  -f/u TTE

## 2017-06-21 NOTE — PROGRESS NOTE ADULT - PROBLEM SELECTOR PLAN 2
hypervolemic  -urine electrolytes show pre-renal numbers with slight overload on exam  -attempt diuresis with fluid restriction no edema today.

## 2017-06-21 NOTE — PROGRESS NOTE ADULT - ATTENDING COMMENTS
her edema is much better today but her AKL is worse ?diuretics related. will start 1.5% gently at 50 cc/hr and check Na in 4 hours. it should overcome the water retentive trend of the kidneys and invoke diuresis and improve hopefully sodium without adversely affecting the Kidney function. monitor respiratory status closely.   D/w primary team

## 2017-06-21 NOTE — PROGRESS NOTE ADULT - SUBJECTIVE AND OBJECTIVE BOX
[facility] DIVISION OF KIDNEY DISEASES AND HYPERTENSION -- FOLLOW UP NOTE  --------------------------------------------------------------------------------  Chief Complaint:  lower extremity edema, jerome    24 hour events/subjective:  patient reports persistent nausea, difficulty keeping down food        PAST HISTORY  --------------------------------------------------------------------------------  No significant changes to PMH, PSH, FHx, SHx, unless otherwise noted    ALLERGIES & MEDICATIONS  --------------------------------------------------------------------------------  Allergies    No Known Allergies    Intolerances      Standing Inpatient Medications  heparin  Injectable 5000Unit(s) SubCutaneous every 12 hours    PRN Inpatient Medications  ondansetron Injectable 4milliGRAM(s) IV Push every 8 hours PRN  acetaminophen   Tablet. 650milliGRAM(s) Oral every 6 hours PRN      REVIEW OF SYSTEMS  --------------------------------------------------------------------------------  +Nausea, improved LE edema    All other systems were reviewed and are negative, except as noted.    VITALS/PHYSICAL EXAM  --------------------------------------------------------------------------------  T(C): 36.7, Max: 36.7 (06-20 @ 15:13)  HR: 60 (60 - 69)  BP: 110/58 (110/58 - 122/70)  RR: 16 (16 - 18)  SpO2: 99% (99% - 100%)  Wt(kg): --  Height (cm): 154.9 (06-20-17 @ 09:48)  Weight (kg): 63.3 (06-20-17 @ 09:48)  BMI (kg/m2): 26.4 (06-20-17 @ 09:48)  BSA (m2): 1.62 (06-20-17 @ 09:48)      I & Os for current day (as of 06-21-17 @ 13:22)  =============================================  IN: 1224 ml / OUT: 1170 ml / NET: 54 ml    Physical Exam:  	Gen: NAD  	HEENT: MMM  	Pulm: left lower lobe rales  	CV: RRR, S1S2; no rub  	Back: no sacral edema  	Abd: +BS, soft, nontender  	: No suprapubic tenderness  	UE: no edema  	LE: minimal pitting edema  	Neuro: No focal deficits, intact gait  	Psych: Normal affect and mood  	Skin: Warm, without rashes    LABS/STUDIES  --------------------------------------------------------------------------------              10.3   5.40  >-----------<  195      [06-20-17 @ 03:00]              29.8     123  |  90  |  41  ----------------------------<  96      [06-21-17 @ 06:00]  4.6   |  20  |  1.81        Ca     8.1     [06-21-17 @ 06:00]    TPro  5.1  /  Alb  2.9  /  TBili  0.4  /  DBili  x   /  AST  46  /  ALT  40  /  AlkPhos  59  [06-21-17 @ 06:00]        Troponin < 0.06      [06-20-17 @ 03:00]        [06-20-17 @ 03:00]  Serum Osmolality 268      [06-20-17 @ 03:00]    Creatinine Trend:  SCr 1.81 [06-21 @ 06:00]  SCr 1.47 [06-20 @ 15:42]  SCr 1.51 [06-20 @ 03:00]    Urinalysis - [06-20-17 @ 13:25]      Color YELLOW / Appearance CLEAR / SG 1.017 / pH 5.5      Gluc NEGATIVE / Ketone NEGATIVE  / Bili NEGATIVE / Urobili NORMAL       Blood SMALL / Protein 30 / Leuk Est NEGATIVE / Nitrite NEGATIVE      RBC 5-10 / WBC 2-5 / Hyaline 2-5 / Gran 2-5 / Sq Epi OCC / Non Sq Epi  / Bacteria     Urine Sodium 9      [06-20-17 @ 13:25]  Urine Potassium 60.0      [06-20-17 @ 13:25]  Urine Chloride 11      [06-20-17 @ 13:25]  Urine Osmolality 513      [06-20-17 @ 13:25]    HbA1c 5.8      [06-21-17 @ 06:00]  TSH 2.74      [06-21-17 @ 06:00]  Lipid: chol 161, , HDL 23,       [06-21-17 @ 06:00] [facility] DIVISION OF KIDNEY DISEASES AND HYPERTENSION -- FOLLOW UP NOTE  --------------------------------------------------------------------------------  Chief Complaint:  lower extremity edema, jerome    24 hour events/subjective:  patient reports persistent nausea, difficulty keeping down food. less swelling        PAST HISTORY  --------------------------------------------------------------------------------  No significant changes to PMH, PSH, FHx, SHx, unless otherwise noted    ALLERGIES & MEDICATIONS  --------------------------------------------------------------------------------  Allergies    No Known Allergies    Intolerances      Standing Inpatient Medications  heparin  Injectable 5000Unit(s) SubCutaneous every 12 hours    PRN Inpatient Medications  ondansetron Injectable 4milliGRAM(s) IV Push every 8 hours PRN  acetaminophen   Tablet. 650milliGRAM(s) Oral every 6 hours PRN      REVIEW OF SYSTEMS  --------------------------------------------------------------------------------  +Nausea, improved LE edema    All other systems were reviewed and are negative, except as noted.    VITALS/PHYSICAL EXAM  --------------------------------------------------------------------------------  T(C): 36.7, Max: 36.7 (06-20 @ 15:13)  HR: 60 (60 - 69)  BP: 110/58 (110/58 - 122/70)  RR: 16 (16 - 18)  SpO2: 99% (99% - 100%)  Wt(kg): --  Height (cm): 154.9 (06-20-17 @ 09:48)  Weight (kg): 63.3 (06-20-17 @ 09:48)  BMI (kg/m2): 26.4 (06-20-17 @ 09:48)  BSA (m2): 1.62 (06-20-17 @ 09:48)      I & Os for current day (as of 06-21-17 @ 13:22)  =============================================  IN: 1224 ml / OUT: 1170 ml / NET: 54 ml    Physical Exam:  	Gen: NAD  	HEENT: MMM  	Pulm: left lower lobe rales  	CV: RRR, S1S2; no rub  	Back: no sacral edema  	Abd: +BS, soft, nontender  	: No suprapubic tenderness  	UE: no edema  	LE: minimal pitting edema  	Neuro: No focal deficits, intact gait  	Psych: Normal affect and mood  	Skin: Warm, without rashes    LABS/STUDIES  --------------------------------------------------------------------------------              10.3   5.40  >-----------<  195      [06-20-17 @ 03:00]              29.8     123  |  90  |  41  ----------------------------<  96      [06-21-17 @ 06:00]  4.6   |  20  |  1.81        Ca     8.1     [06-21-17 @ 06:00]    TPro  5.1  /  Alb  2.9  /  TBili  0.4  /  DBili  x   /  AST  46  /  ALT  40  /  AlkPhos  59  [06-21-17 @ 06:00]        Troponin < 0.06      [06-20-17 @ 03:00]        [06-20-17 @ 03:00]  Serum Osmolality 268      [06-20-17 @ 03:00]    Creatinine Trend:  SCr 1.81 [06-21 @ 06:00]  SCr 1.47 [06-20 @ 15:42]  SCr 1.51 [06-20 @ 03:00]    Urinalysis - [06-20-17 @ 13:25]      Color YELLOW / Appearance CLEAR / SG 1.017 / pH 5.5      Gluc NEGATIVE / Ketone NEGATIVE  / Bili NEGATIVE / Urobili NORMAL       Blood SMALL / Protein 30 / Leuk Est NEGATIVE / Nitrite NEGATIVE      RBC 5-10 / WBC 2-5 / Hyaline 2-5 / Gran 2-5 / Sq Epi OCC / Non Sq Epi  / Bacteria     Urine Sodium 9      [06-20-17 @ 13:25]  Urine Potassium 60.0      [06-20-17 @ 13:25]  Urine Chloride 11      [06-20-17 @ 13:25]  Urine Osmolality 513      [06-20-17 @ 13:25]    HbA1c 5.8      [06-21-17 @ 06:00]  TSH 2.74      [06-21-17 @ 06:00]  Lipid: chol 161, , HDL 23,       [06-21-17 @ 06:00]

## 2017-06-21 NOTE — PROGRESS NOTE ADULT - PROBLEM SELECTOR PLAN 4
not c/w, CHF, low albumin w/o nephrotic range proteinuria on UA   -improved with lasix x 1  -f/u renal recs  -f/u TTE Improved with zofran, no vomiting x 24 hour; abdominal US neg   f/u cyanide level  check CTH to r/o central cause for hypoNa given n/v/dizziness

## 2017-06-21 NOTE — PROGRESS NOTE ADULT - PROBLEM SELECTOR PROBLEM 4
Edema, unspecified type Nausea and vomiting, intractability of vomiting not specified, unspecified vomiting type

## 2017-06-22 DIAGNOSIS — D64.9 ANEMIA, UNSPECIFIED: ICD-10-CM

## 2017-06-22 LAB
ALBUMIN SERPL ELPH-MCNC: 2.8 G/DL — LOW (ref 3.3–5)
ALP SERPL-CCNC: 56 U/L — SIGNIFICANT CHANGE UP (ref 40–120)
ALT FLD-CCNC: 24 U/L — SIGNIFICANT CHANGE UP (ref 4–33)
ANA TITR SER: NEGATIVE — SIGNIFICANT CHANGE UP
APPEARANCE UR: CLEAR — SIGNIFICANT CHANGE UP
AST SERPL-CCNC: 25 U/L — SIGNIFICANT CHANGE UP (ref 4–32)
BACTERIA # UR AUTO: SIGNIFICANT CHANGE UP
BILIRUB SERPL-MCNC: 0.4 MG/DL — SIGNIFICANT CHANGE UP (ref 0.2–1.2)
BILIRUB UR-MCNC: NEGATIVE — SIGNIFICANT CHANGE UP
BLOOD UR QL VISUAL: HIGH
BUN SERPL-MCNC: 39 MG/DL — HIGH (ref 7–23)
BUN SERPL-MCNC: 40 MG/DL — HIGH (ref 7–23)
BUN SERPL-MCNC: 42 MG/DL — HIGH (ref 7–23)
BUN SERPL-MCNC: 45 MG/DL — HIGH (ref 7–23)
C-ANCA SER-ACNC: NEGATIVE — SIGNIFICANT CHANGE UP
CALCIUM SERPL-MCNC: 7.7 MG/DL — LOW (ref 8.4–10.5)
CALCIUM SERPL-MCNC: 8.1 MG/DL — LOW (ref 8.4–10.5)
CALCIUM SERPL-MCNC: 8.1 MG/DL — LOW (ref 8.4–10.5)
CALCIUM SERPL-MCNC: 8.2 MG/DL — LOW (ref 8.4–10.5)
CHLORIDE SERPL-SCNC: 91 MMOL/L — LOW (ref 98–107)
CHLORIDE SERPL-SCNC: 92 MMOL/L — LOW (ref 98–107)
CHLORIDE SERPL-SCNC: 95 MMOL/L — LOW (ref 98–107)
CHLORIDE SERPL-SCNC: 96 MMOL/L — LOW (ref 98–107)
CO2 SERPL-SCNC: 18 MMOL/L — LOW (ref 22–31)
CO2 SERPL-SCNC: 20 MMOL/L — LOW (ref 22–31)
COLOR SPEC: SIGNIFICANT CHANGE UP
CREAT ?TM UR-MCNC: 96.5 MG/DL — SIGNIFICANT CHANGE UP
CREAT SERPL-MCNC: 1.44 MG/DL — HIGH (ref 0.5–1.3)
CREAT SERPL-MCNC: 1.46 MG/DL — HIGH (ref 0.5–1.3)
CREAT SERPL-MCNC: 1.52 MG/DL — HIGH (ref 0.5–1.3)
CREAT SERPL-MCNC: 1.6 MG/DL — HIGH (ref 0.5–1.3)
CYANIDE SERPL-MCNC: <0.1 MG/L — SIGNIFICANT CHANGE UP
DSDNA AB SER-ACNC: <12 IU/ML — SIGNIFICANT CHANGE UP
FERRITIN SERPL-MCNC: 165.6 NG/ML — HIGH (ref 15–150)
GBM IGG SER-ACNC: <0.2 U — SIGNIFICANT CHANGE UP
GLUCOSE SERPL-MCNC: 108 MG/DL — HIGH (ref 70–99)
GLUCOSE SERPL-MCNC: 112 MG/DL — HIGH (ref 70–99)
GLUCOSE SERPL-MCNC: 123 MG/DL — HIGH (ref 70–99)
GLUCOSE SERPL-MCNC: 99 MG/DL — SIGNIFICANT CHANGE UP (ref 70–99)
GLUCOSE UR-MCNC: NEGATIVE — SIGNIFICANT CHANGE UP
HAV IGM SER-ACNC: NONREACTIVE — SIGNIFICANT CHANGE UP
HBV CORE IGM SER-ACNC: NONREACTIVE — SIGNIFICANT CHANGE UP
HBV SURFACE AG SER-ACNC: NONREACTIVE — SIGNIFICANT CHANGE UP
HCT VFR BLD CALC: 26.5 % — LOW (ref 34.5–45)
HCV AB S/CO SERPL IA: 0.18 S/CO — SIGNIFICANT CHANGE UP
HCV AB SERPL-IMP: SIGNIFICANT CHANGE UP
HGB BLD-MCNC: 9.2 G/DL — LOW (ref 11.5–15.5)
HYALINE CASTS # UR AUTO: SIGNIFICANT CHANGE UP (ref 0–?)
IRON SATN MFR SERPL: 197 UG/DL — SIGNIFICANT CHANGE UP (ref 140–530)
IRON SATN MFR SERPL: 36 UG/DL — SIGNIFICANT CHANGE UP (ref 30–160)
KETONES UR-MCNC: NEGATIVE — SIGNIFICANT CHANGE UP
LEUKOCYTE ESTERASE UR-ACNC: NEGATIVE — SIGNIFICANT CHANGE UP
MAGNESIUM SERPL-MCNC: 2.1 MG/DL — SIGNIFICANT CHANGE UP (ref 1.6–2.6)
MCHC RBC-ENTMCNC: 30.7 PG — SIGNIFICANT CHANGE UP (ref 27–34)
MCHC RBC-ENTMCNC: 34.7 % — SIGNIFICANT CHANGE UP (ref 32–36)
MCV RBC AUTO: 88.3 FL — SIGNIFICANT CHANGE UP (ref 80–100)
MUCOUS THREADS # UR AUTO: SIGNIFICANT CHANGE UP
NITRITE UR-MCNC: NEGATIVE — SIGNIFICANT CHANGE UP
NON-SQ EPI CELLS # UR AUTO: <1 — SIGNIFICANT CHANGE UP
OSMOLALITY UR: 376 MOSMO/KG — SIGNIFICANT CHANGE UP (ref 50–1200)
P-ANCA SER-ACNC: NEGATIVE — SIGNIFICANT CHANGE UP
PH UR: 5.5 — SIGNIFICANT CHANGE UP (ref 4.6–8)
PHOSPHATE SERPL-MCNC: 3.3 MG/DL — SIGNIFICANT CHANGE UP (ref 2.5–4.5)
PLATELET # BLD AUTO: 177 K/UL — SIGNIFICANT CHANGE UP (ref 150–400)
PMV BLD: 8.9 FL — SIGNIFICANT CHANGE UP (ref 7–13)
POTASSIUM SERPL-MCNC: 4.7 MMOL/L — SIGNIFICANT CHANGE UP (ref 3.5–5.3)
POTASSIUM SERPL-MCNC: 5 MMOL/L — SIGNIFICANT CHANGE UP (ref 3.5–5.3)
POTASSIUM SERPL-MCNC: 5.3 MMOL/L — SIGNIFICANT CHANGE UP (ref 3.5–5.3)
POTASSIUM SERPL-MCNC: 5.4 MMOL/L — HIGH (ref 3.5–5.3)
POTASSIUM SERPL-SCNC: 4.7 MMOL/L — SIGNIFICANT CHANGE UP (ref 3.5–5.3)
POTASSIUM SERPL-SCNC: 5 MMOL/L — SIGNIFICANT CHANGE UP (ref 3.5–5.3)
POTASSIUM SERPL-SCNC: 5.3 MMOL/L — SIGNIFICANT CHANGE UP (ref 3.5–5.3)
POTASSIUM SERPL-SCNC: 5.4 MMOL/L — HIGH (ref 3.5–5.3)
PROT SERPL-MCNC: 5 G/DL — LOW (ref 6–8.3)
PROT UR-MCNC: 15 MG/DL — SIGNIFICANT CHANGE UP
PROT UR-MCNC: 20 — SIGNIFICANT CHANGE UP
RBC # BLD: 3 M/UL — LOW (ref 3.8–5.2)
RBC # FLD: 12.6 % — SIGNIFICANT CHANGE UP (ref 10.3–14.5)
RBC CASTS # UR COMP ASSIST: SIGNIFICANT CHANGE UP (ref 0–?)
RETICS #: 55.8 10X3/UL — SIGNIFICANT CHANGE UP (ref 17–73)
RETICS/RBC NFR: 1.9 % — SIGNIFICANT CHANGE UP (ref 0.5–2.5)
SODIUM SERPL-SCNC: 124 MMOL/L — LOW (ref 135–145)
SODIUM SERPL-SCNC: 125 MMOL/L — LOW (ref 135–145)
SODIUM SERPL-SCNC: 126 MMOL/L — LOW (ref 135–145)
SODIUM SERPL-SCNC: 128 MMOL/L — LOW (ref 135–145)
SP GR SPEC: 1.01 — SIGNIFICANT CHANGE UP (ref 1–1.03)
SQUAMOUS # UR AUTO: SIGNIFICANT CHANGE UP
UIBC SERPL-MCNC: 161 UG/DL — SIGNIFICANT CHANGE UP (ref 110–370)
UROBILINOGEN FLD QL: NORMAL E.U. — SIGNIFICANT CHANGE UP (ref 0.1–0.2)
WBC # BLD: 4.42 K/UL — SIGNIFICANT CHANGE UP (ref 3.8–10.5)
WBC # FLD AUTO: 4.42 K/UL — SIGNIFICANT CHANGE UP (ref 3.8–10.5)
WBC UR QL: SIGNIFICANT CHANGE UP (ref 0–?)

## 2017-06-22 PROCEDURE — 99232 SBSQ HOSP IP/OBS MODERATE 35: CPT | Mod: GC

## 2017-06-22 PROCEDURE — 99233 SBSQ HOSP IP/OBS HIGH 50: CPT

## 2017-06-22 RX ORDER — SODIUM POLYSTYRENE SULFONATE 4.1 MEQ/G
15 POWDER, FOR SUSPENSION ORAL ONCE
Qty: 0 | Refills: 0 | Status: COMPLETED | OUTPATIENT
Start: 2017-06-22 | End: 2017-06-22

## 2017-06-22 RX ADMIN — Medication 650 MILLIGRAM(S): at 23:00

## 2017-06-22 RX ADMIN — Medication 650 MILLIGRAM(S): at 22:16

## 2017-06-22 RX ADMIN — SODIUM CHLORIDE 75 MILLILITER(S): 5 INJECTION, SOLUTION INTRAVENOUS at 09:26

## 2017-06-22 RX ADMIN — SODIUM POLYSTYRENE SULFONATE 15 GRAM(S): 4.1 POWDER, FOR SUSPENSION ORAL at 17:11

## 2017-06-22 NOTE — PROGRESS NOTE ADULT - PROBLEM SELECTOR PROBLEM 5
Hypercholesteremia Nausea and vomiting, intractability of vomiting not specified, unspecified vomiting type

## 2017-06-22 NOTE — PROGRESS NOTE ADULT - PROBLEM SELECTOR PLAN 1
-creatinine stable with low rate IV hydration with 1.5% saline  -urine electrolytes suggestive of pre-renal etiology despite history of severe lower extremity edema.  Low oncotic state is a possible contributer, but proteinuria on U/A is minimal and not consistent with acute nephrotic syndrome.  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

## 2017-06-22 NOTE — PROGRESS NOTE ADULT - PROBLEM SELECTOR PROBLEM 4
Nausea and vomiting, intractability of vomiting not specified, unspecified vomiting type Anemia, unspecified type

## 2017-06-22 NOTE — PROGRESS NOTE ADULT - SUBJECTIVE AND OBJECTIVE BOX
[facility] DIVISION OF KIDNEY DISEASES AND HYPERTENSION -- FOLLOW UP NOTE  --------------------------------------------------------------------------------  Chief Complaint:  lower extremity edema    24 hour events/subjective:  patient on 1.5% hypertonic saline, denies any shortness of breath or worsening LE edema        PAST HISTORY  --------------------------------------------------------------------------------  No significant changes to PMH, PSH, FHx, SHx, unless otherwise noted    ALLERGIES & MEDICATIONS  --------------------------------------------------------------------------------  Allergies    No Known Allergies    Intolerances      Standing Inpatient Medications  heparin  Injectable 5000Unit(s) SubCutaneous every 12 hours  sodium chloride 1.5%. 500milliLiter(s) IV Continuous <Continuous>    PRN Inpatient Medications  ondansetron Injectable 4milliGRAM(s) IV Push every 8 hours PRN  acetaminophen   Tablet. 650milliGRAM(s) Oral every 6 hours PRN      REVIEW OF SYSTEMS  --------------------------------------------------------------------------------  No complaints    All other systems were reviewed and are negative, except as noted.    VITALS/PHYSICAL EXAM  --------------------------------------------------------------------------------  T(C): 37, Max: 37 (06-22 @ 05:40)  HR: 63 (62 - 65)  BP: 120/78 (120/66 - 121/68)  RR: 16 (16 - 17)  SpO2: 97% (97% - 100%)  Wt(kg): --      I & Os for 24h ending 06-22-17 @ 07:00  =============================================  IN: 800 ml / OUT: 800 ml / NET: 0 ml    I & Os for current day (as of 06-22-17 @ 10:21)  =============================================  IN: 0 ml / OUT: 150 ml / NET: -150 ml    Physical Exam:  	Gen: NAD, well-appearing  	HEENT: MMM  	Pulm: faint basilar rales  	CV: RRR, S1S2; no rub  	Abd: +BS, soft, nontender  	: No suprapubic tenderness  	UE: no edema  	LE: mild pitting edema bilaterally  	Neuro: No focal deficits  	Psych: Normal affect and mood  	Skin: Warm, without rashes    LABS/STUDIES  --------------------------------------------------------------------------------              9.2    4.42  >-----------<  177      [06-22-17 @ 04:00]              26.5     124  |  91  |  40  ----------------------------<  99      [06-22-17 @ 04:00]  5.0   |  18  |  1.52        Ca     7.7     [06-22-17 @ 04:00]      Mg     2.1     [06-22-17 @ 04:00]      Phos  3.3     [06-22-17 @ 04:00]    TPro  5.0  /  Alb  2.8  /  TBili  0.4  /  DBili  x   /  AST  25  /  ALT  24  /  AlkPhos  56  [06-22-17 @ 04:00]              [06-21-17 @ 15:19]    Creatinine Trend:  SCr 1.52 [06-22 @ 04:00]  SCr 1.54 [06-21 @ 22:10]  SCr 1.60 [06-21 @ 15:19]  SCr 1.81 [06-21 @ 06:00]  SCr 1.47 [06-20 @ 15:42] [facility] DIVISION OF KIDNEY DISEASES AND HYPERTENSION -- FOLLOW UP NOTE  --------------------------------------------------------------------------------  Chief Complaint:  lower extremity edema    24 hour events/subjective:  patient on 1.5% hypertonic saline, denies any shortness of breath or worsening LE edema        PAST HISTORY  --------------------------------------------------------------------------------  No significant changes to PMH, PSH, FHx, SHx, unless otherwise noted    ALLERGIES & MEDICATIONS  --------------------------------------------------------------------------------  Allergies    No Known Allergies    Intolerances      Standing Inpatient Medications  heparin  Injectable 5000Unit(s) SubCutaneous every 12 hours  sodium chloride 1.5%. 500milliLiter(s) IV Continuous <Continuous>    PRN Inpatient Medications  ondansetron Injectable 4milliGRAM(s) IV Push every 8 hours PRN  acetaminophen   Tablet. 650milliGRAM(s) Oral every 6 hours PRN      REVIEW OF SYSTEMS  --------------------------------------------------------------------------------  No SOB/CP/abdo pain.     All other systems were reviewed and are negative, except as noted.    VITALS/PHYSICAL EXAM  --------------------------------------------------------------------------------  T(C): 37, Max: 37 (06-22 @ 05:40)  HR: 63 (62 - 65)  BP: 120/78 (120/66 - 121/68)  RR: 16 (16 - 17)  SpO2: 97% (97% - 100%)  Wt(kg): --      I & Os for 24h ending 06-22-17 @ 07:00  =============================================  IN: 800 ml / OUT: 800 ml / NET: 0 ml    I & Os for current day (as of 06-22-17 @ 10:21)  =============================================  IN: 0 ml / OUT: 150 ml / NET: -150 ml    Physical Exam:  	Gen: NAD, well-appearing  	HEENT: MMM  	Pulm: faint basilar rales  	CV: RRR, S1S2; no rub  	Abd: +BS, soft, nontender  	: No suprapubic tenderness  	UE: no edema  	LE: mild pitting edema bilaterally  	Neuro: No focal deficits  	Psych: Normal affect and mood  	Skin: Warm, without rashes    LABS/STUDIES  --------------------------------------------------------------------------------              9.2    4.42  >-----------<  177      [06-22-17 @ 04:00]              26.5     124  |  91  |  40  ----------------------------<  99      [06-22-17 @ 04:00]  5.0   |  18  |  1.52        Ca     7.7     [06-22-17 @ 04:00]      Mg     2.1     [06-22-17 @ 04:00]      Phos  3.3     [06-22-17 @ 04:00]    TPro  5.0  /  Alb  2.8  /  TBili  0.4  /  DBili  x   /  AST  25  /  ALT  24  /  AlkPhos  56  [06-22-17 @ 04:00]              [06-21-17 @ 15:19]    Creatinine Trend:  SCr 1.52 [06-22 @ 04:00]  SCr 1.54 [06-21 @ 22:10]  SCr 1.60 [06-21 @ 15:19]  SCr 1.81 [06-21 @ 06:00]  SCr 1.47 [06-20 @ 15:42]

## 2017-06-22 NOTE — PROGRESS NOTE ADULT - PROBLEM SELECTOR PLAN 4
Improved with zofran, no vomiting x 24 hour; abdominal US neg; CTH negative for central cause of n/hypoNa   f/u cyanide level stable, no evidence of hemolysis, baseline H/H wnl per outside labs, suspect 2/2 parvo  -labs c/w AOCD  -f/u retic count

## 2017-06-22 NOTE — PROGRESS NOTE ADULT - PROBLEM SELECTOR PLAN 3
not c/w CHF, low albumin w/o nephrotic range proteinuria on UA pr Spot Protein/Cr however suspect low protein state resonsible for swelling secondary to renal failure of unclear etiology but suspect post infectious intrinsic glomerular process, CXR not c/w CHF, BNP in range  -edema improved with lasix x 1  -f/u renal recs  -f/u TTE, parvo can cause myopathy not c/w CHF, low albumin w/o nephrotic range proteinuria on UA pr Spot Protein/Cr however suspect low protein state resonsible for swelling secondary to renal failure of unclear etiology but suspect post infectious intrinsic glomerular process, CXR not c/w CHF, BNP in range  -edema improved with lasix x 1  -f/u renal recs  -f/u TTE, parvo can cause myopathy  -daily weights

## 2017-06-22 NOTE — PROGRESS NOTE ADULT - ASSESSMENT
A 62 year old Female with PMH HLD( not on medication) who presented with headaches, nausea, acute onset lower extremity edema for  the last 4 days  found to have hyponatremia and jerome.

## 2017-06-22 NOTE — PROGRESS NOTE ADULT - ASSESSMENT
63 yo F PMH HLD presented for severe nausea/vomiting x2d with associated decreased UOP and swelling noted to be hyponatremia and in ETHEL.

## 2017-06-22 NOTE — PROGRESS NOTE ADULT - SUBJECTIVE AND OBJECTIVE BOX
Patient is a 61 yo Female who presents with a chief complaint of N/V hyponatremia, ETHEL      SUBJECTIVE / OVERNIGHT EVENTS:    Patient reports feeling better today than yesterday, HA resolved  No nausea vomiting, tolerated limited PO but appetite a bit better.  Patient is urinating well,  still feels bloated but improved from admission, reports baseline weight is 126.  Had normal BM.    Per renal recommendations 1.5% NS started with mild improvement in Na 122->124.  Cr about stable ~1.5.  CTH performed given n/v/HA and hyponatremia negative for abnormality.  C3/C4 very low.  H/H stable w/o evidence of hemolysis.     MEDICATIONS  (STANDING):  heparin  Injectable 5000Unit(s) SubCutaneous every 12 hours  sodium chloride 1.5%. 500milliLiter(s) IV Continuous <Continuous>    MEDICATIONS  (PRN):  ondansetron Injectable 4milliGRAM(s) IV Push every 8 hours PRN Nausea and/or Vomiting  acetaminophen   Tablet. 650milliGRAM(s) Oral every 6 hours PRN Mild Pain (1 - 3)      Vital Signs Last 24 Hrs  T(C): 37, Max: 37 (06-22 @ 05:40)  HR: 63 (62 - 65)  BP: 120/78 (120/66 - 121/68)  RR: 16 (16 - 17)  SpO2: 97% (97% - 100%)  Wt(kg): --    CAPILLARY BLOOD GLUCOSE      I&O's Summary    I & Os for current day (as of 2017 10:07)  =============================================  IN: 800 ml / OUT: 800 ml / NET: 0 ml      PHYSICAL EXAM:  GENERAL: NAD  HEAD:  Atraumatic, Normocephalic  EYES: sclera appear mildly icteric/yellow (however TB wnl), EOMI, b/l subjunctional hemorrhages   NECK: Supple, No JVD  CHEST/LUNG: Clear to auscultation bilaterally; No wheeze, decreased BS at bases   HEART: Regular rate and rhythm; No murmurs, rubs, or gallops  ABDOMEN: mild distention, no r/g, soft   EXTREMITIES:   wwp, trace pedal edema  PSYCH: AAOx3  NEUROLOGY: non-focal      LABS:                        9.2    4.42  )-----------( 177      ( 2017 04:00 )             26.5         124<L>  |  91<L>  |  40<H>  ----------------------------<  99  5.0   |  18<L>  |  1.52<H>    Ca    7.7<L>      2017 04:00  Phos  3.3       Mg     2.1         TPro  5.0<L>  /  Alb  2.8<L>  /  TBili  0.4  /  DBili  x   /  AST  25  /  ALT  24  /  AlkPhos  56        Urinalysis Basic - ( 2017 13:25 )    Color: YELLOW / Appearance: CLEAR / S.017 / pH: 5.5  Gluc: NEGATIVE / Ketone: NEGATIVE  / Bili: NEGATIVE / Urobili: NORMAL E.U.   Blood: SMALL / Protein: 30 / Nitrite: NEGATIVE   Leuk Esterase: NEGATIVE / RBC: 5-10 / WBC 2-5   Sq Epi: OCC / Non Sq Epi: x / Bacteria: x        Microbiology:     parvo IgM/IgG ++  parvo PCR pending     Consultant(s) Notes Reviewed:  renal     Care Discussed with Consultants/Other Providers: renal

## 2017-06-22 NOTE — PROGRESS NOTE ADULT - ATTENDING COMMENTS
A 62 year old Female with PMH HLD( not on medication) who presented with headaches, nausea, acute onset lower extremity edema for  the last 4 days found to have hyponatremia and jerome in setting of parvovirus infection.  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  If renal function does not improve further, would proceed with biopsy. low urine Na also in keeping with acute GN.  Will re-evaluate tomorrow re: biopsy.   -re: low Na - improving on 1.5% NS, likely in setting of high ADH state. stop 1.5% after 24 hours total, and then recheck Na.

## 2017-06-22 NOTE — PROGRESS NOTE ADULT - PROBLEM SELECTOR PLAN 5
on no meds Improved with zofran, no vomiting x 24 hour; abdominal US neg; CTH negative for central cause of n/hypoNa   f/u cyanide level

## 2017-06-22 NOTE — PROGRESS NOTE ADULT - PROBLEM SELECTOR PLAN 2
improving slowly on 1.5% saline  urine osm 376 -likely in setting of high ADH state   improving slowly on 1.5% saline  urine osm 376  -continue IV to complete 24 hours, then d/c and recheck serum NA

## 2017-06-22 NOTE — PROGRESS NOTE ADULT - PROBLEM SELECTOR PLAN 1
unclear insult to kidney, appears to be intrinsic, not fluid or diuretic responsive in regards to Cr elevation, no obstruction, got labs faxed from PCP 6/2 Cr 0.9 with sodium of 134; 6/22 spot protein/Cr = 115, low C3/C4, hepatitis panel negative, RF wnl; LDH/hapto/plt not c/w HUS/TTP  - f/u dsDNA,   -?renal bx, d/w renal yesterday state not need at this time  - 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

## 2017-06-23 LAB
BUN SERPL-MCNC: 43 MG/DL — HIGH (ref 7–23)
BUN SERPL-MCNC: 43 MG/DL — HIGH (ref 7–23)
CALCIUM SERPL-MCNC: 7.8 MG/DL — LOW (ref 8.4–10.5)
CALCIUM SERPL-MCNC: 8.4 MG/DL — SIGNIFICANT CHANGE UP (ref 8.4–10.5)
CHLORIDE SERPL-SCNC: 94 MMOL/L — LOW (ref 98–107)
CHLORIDE SERPL-SCNC: 95 MMOL/L — LOW (ref 98–107)
CO2 SERPL-SCNC: 17 MMOL/L — LOW (ref 22–31)
CO2 SERPL-SCNC: 19 MMOL/L — LOW (ref 22–31)
CREAT SERPL-MCNC: 1.41 MG/DL — HIGH (ref 0.5–1.3)
CREAT SERPL-MCNC: 1.44 MG/DL — HIGH (ref 0.5–1.3)
GLUCOSE SERPL-MCNC: 117 MG/DL — HIGH (ref 70–99)
GLUCOSE SERPL-MCNC: 96 MG/DL — SIGNIFICANT CHANGE UP (ref 70–99)
HCT VFR BLD CALC: 26.3 % — LOW (ref 34.5–45)
HGB BLD-MCNC: 9.2 G/DL — LOW (ref 11.5–15.5)
MAGNESIUM SERPL-MCNC: 2.2 MG/DL — SIGNIFICANT CHANGE UP (ref 1.6–2.6)
MCHC RBC-ENTMCNC: 31.2 PG — SIGNIFICANT CHANGE UP (ref 27–34)
MCHC RBC-ENTMCNC: 35 % — SIGNIFICANT CHANGE UP (ref 32–36)
MCV RBC AUTO: 89.2 FL — SIGNIFICANT CHANGE UP (ref 80–100)
PHOSPHATE SERPL-MCNC: 3.3 MG/DL — SIGNIFICANT CHANGE UP (ref 2.5–4.5)
PLATELET # BLD AUTO: 171 K/UL — SIGNIFICANT CHANGE UP (ref 150–400)
PMV BLD: 9.2 FL — SIGNIFICANT CHANGE UP (ref 7–13)
POTASSIUM SERPL-MCNC: 4.6 MMOL/L — SIGNIFICANT CHANGE UP (ref 3.5–5.3)
POTASSIUM SERPL-MCNC: 4.7 MMOL/L — SIGNIFICANT CHANGE UP (ref 3.5–5.3)
POTASSIUM SERPL-SCNC: 4.6 MMOL/L — SIGNIFICANT CHANGE UP (ref 3.5–5.3)
POTASSIUM SERPL-SCNC: 4.7 MMOL/L — SIGNIFICANT CHANGE UP (ref 3.5–5.3)
RBC # BLD: 2.95 M/UL — LOW (ref 3.8–5.2)
RBC # FLD: 12.8 % — SIGNIFICANT CHANGE UP (ref 10.3–14.5)
SODIUM SERPL-SCNC: 127 MMOL/L — LOW (ref 135–145)
SODIUM SERPL-SCNC: 127 MMOL/L — LOW (ref 135–145)
WBC # BLD: 4.05 K/UL — SIGNIFICANT CHANGE UP (ref 3.8–10.5)
WBC # FLD AUTO: 4.05 K/UL — SIGNIFICANT CHANGE UP (ref 3.8–10.5)

## 2017-06-23 PROCEDURE — 99233 SBSQ HOSP IP/OBS HIGH 50: CPT

## 2017-06-23 PROCEDURE — 99233 SBSQ HOSP IP/OBS HIGH 50: CPT | Mod: GC

## 2017-06-23 RX ORDER — FUROSEMIDE 40 MG
20 TABLET ORAL DAILY
Qty: 0 | Refills: 0 | Status: DISCONTINUED | OUTPATIENT
Start: 2017-06-23 | End: 2017-06-25

## 2017-06-23 RX ADMIN — Medication 20 MILLIGRAM(S): at 16:20

## 2017-06-23 NOTE — PROGRESS NOTE ADULT - PROBLEM SELECTOR PLAN 3
not c/w CHF, low albumin w/o nephrotic range proteinuria on UA pr Spot Protein/Cr however suspect low protein state resonsible for swelling secondary to renal failure of unclear etiology but suspect post infectious intrinsic glomerular process, CXR not c/w CHF, BNP in range  -f/u renal recs  -f/u TTE pending   -daily weights

## 2017-06-23 NOTE — PROGRESS NOTE ADULT - PROBLEM SELECTOR PLAN 4
stable, no evidence of hemolysis, baseline H/H wnl per outside labs, suspect 2/2 parvo as low retic  -labs c/w AOCD  -f/u retic count

## 2017-06-23 NOTE — PROGRESS NOTE ADULT - PROBLEM SELECTOR PLAN 2
-stable  -continue fluid restriction, liberalize PO salt  -patient education on fluid intake, sucking on ice

## 2017-06-23 NOTE — PROGRESS NOTE ADULT - PROBLEM SELECTOR PLAN 1
-high level of suspicion of acute GN from parvovirus with possible transient proteinuria / nephrotic syndrome that is improving  -pending parvovirus DNA pcr  -if creatinine continues to improve then will not perform kidney biopsy  -if creatinine worsens over weekend then will consider renal biopsy to confirm diagnosis of parvo-induced GN and initiation of IVIG some NSAIDs use. it could be hemodynamic or AIN related to parvo. minimal proteinuria argues against Nepgrotic or nephriric process.. some NSAIDs use. it could be hemodynamic or AIN related to parvo. minimal proteinuria argues against Nephrotic or nephritic process..   low compliments likely related to parvo but not GN

## 2017-06-23 NOTE — PROGRESS NOTE ADULT - ASSESSMENT
A 62 year old Female with PMH HLD( not on medication) who presented with headaches, nausea, acute onset lower extremity edema for  the last 4 days  found to have hyponatremia and jerome, possible acute GN from parvovirus. A 62 year old Female with PMH HLD( not on medication) who presented with headaches, nausea, acute onset lower extremity edema for  the last 4 days  found to have hyponatremia and jerome,

## 2017-06-23 NOTE — PROGRESS NOTE ADULT - PROBLEM SELECTOR PLAN 2
hypervolemic hyponatremia, nonoligouric renal failure improved s/p 1.5% NS x 24 hour  on fluid restriction  f/u renal recs

## 2017-06-23 NOTE — PROGRESS NOTE ADULT - ASSESSMENT
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

## 2017-06-23 NOTE — PROGRESS NOTE ADULT - PROBLEM SELECTOR PLAN 1
unclear insult to kidney, appears to be intrinsic, not fluid or diuretic responsive in regards to Cr elevation, no obstruction, got labs faxed from PCP 6/2 Cr 0.9 with sodium of 134; 6/22 spot protein/Cr = 115, low C3/C4, hepatitis panel negative, RF wnl; Ds DNA, GN Ab, etc all negative; LDH/hapto/plt not c/w HUS/TTP;  presumed parvo GN  -awaiting renal recovery  -if no improvement over weekend may consider renal bx per renal  -maintain fluid restriciton

## 2017-06-23 NOTE — PROGRESS NOTE ADULT - SUBJECTIVE AND OBJECTIVE BOX
[facility] DIVISION OF KIDNEY DISEASES AND HYPERTENSION -- FOLLOW UP NOTE  --------------------------------------------------------------------------------  Chief Complaint:  LE edema    24 hour events/subjective:  IV fluids were held last night with stable creatinine.  Edema has improved per patient.        PAST HISTORY  --------------------------------------------------------------------------------  No significant changes to PMH, PSH, FHx, SHx, unless otherwise noted    ALLERGIES & MEDICATIONS  --------------------------------------------------------------------------------  Allergies    No Known Allergies    Intolerances      Standing Inpatient Medications  heparin  Injectable 5000Unit(s) SubCutaneous every 12 hours    PRN Inpatient Medications  ondansetron Injectable 4milliGRAM(s) IV Push every 8 hours PRN  acetaminophen   Tablet. 650milliGRAM(s) Oral every 6 hours PRN      REVIEW OF SYSTEMS  --------------------------------------------------------------------------------  Improved edema, no dyspnea on exertion, no orthopnea    All other systems were reviewed and are negative, except as noted.    VITALS/PHYSICAL EXAM  --------------------------------------------------------------------------------  T(C): 36.6, Max: 36.9 (06-22 @ 20:58)  HR: 68 (68 - 77)  BP: 131/70 (131/70 - 137/79)  RR: 18 (17 - 18)  SpO2: 95% (95% - 98%)  Wt(kg): --        I & Os for current day (as of 06-23-17 @ 14:01)  =============================================  IN: 1250 ml / OUT: 1150 ml / NET: 100 ml    Physical Exam:  	Gen: NAD, well-appearing  	HEENT: MMM  	Pulm: CTA B/L  	CV: RRR, S1S2; no rub  	Abd: +BS, soft, nontender/nondistended  	: No suprapubic tenderness  	UE: no edema  	LE: + nonpitting edema  	Neuro: No focal deficits  	Psych: Normal affect and mood  	Skin: Warm, without rashes  	  LABS/STUDIES  --------------------------------------------------------------------------------              9.2    4.05  >-----------<  171      [06-23-17 @ 04:20]              26.3     127  |  94  |  43  ----------------------------<  117      [06-23-17 @ 10:50]  4.6   |  19  |  1.44        Ca     8.4     [06-23-17 @ 10:50]      Mg     2.2     [06-23-17 @ 10:50]      Phos  3.3     [06-23-17 @ 10:50]    TPro  5.0  /  Alb  2.8  /  TBili  0.4  /  DBili  x   /  AST  25  /  ALT  24  /  AlkPhos  56  [06-22-17 @ 04:00]              [06-21-17 @ 15:19]    Creatinine Trend:  SCr 1.44 [06-23 @ 10:50]  SCr 1.41 [06-23 @ 04:20]  SCr 1.46 [06-22 @ 22:10]  SCr 1.60 [06-22 @ 15:39]  SCr 1.44 [06-22 @ 09:50]

## 2017-06-23 NOTE — PROGRESS NOTE ADULT - SUBJECTIVE AND OBJECTIVE BOX
Patient is a 62y old  Female who presents with a chief complaint of N/V hyponatremia      SUBJECTIVE / OVERNIGHT EVENTS:    No overnight events.  Tolerating all her meals but not full appetite yet.  Still feels swelling is overall better,  no f/c.  No chest pain or SOB. + urination, + BM w/o diarrhea.     MEDICATIONS  (STANDING):  heparin  Injectable 5000Unit(s) SubCutaneous every 12 hours    MEDICATIONS  (PRN):  ondansetron Injectable 4milliGRAM(s) IV Push every 8 hours PRN Nausea and/or Vomiting  acetaminophen   Tablet. 650milliGRAM(s) Oral every 6 hours PRN Mild Pain (1 - 3)      Vital Signs Last 24 Hrs  T(C): 36.8, Max: 36.9 (06- @ 20:58)  HR: 74 (68 - 77)  BP: 128/73 (128/73 - 137/79)  RR: 17 (17 - 18)  SpO2: 100% (95% - 100%)  Wt(kg): --      I&O's Summary    I & Os for current day (as of 2017 14:57)  =============================================  IN: 1250 ml / OUT: 1150 ml / NET: 100 ml      PHYSICAL EXAM:  GENERAL: NAD  HEAD:  Atraumatic, Normocephalic  EYES: sclera appear mildly icteric/yellow (however TB wnl), EOMI, b/l subjunctional hemorrhages   NECK: Supple, No JVD  CHEST/LUNG: Clear to auscultation bilaterally; No wheeze, decreased BS at bases   HEART: Regular rate and rhythm; No murmurs, rubs, or gallops  ABDOMEN: mild distention, no r/g, soft   EXTREMITIES:   wwp, trace pedal edema  PSYCH: AAOx3  NEUROLOGY: non-focal    LABS:                        9.2    4.05  )-----------( 171      ( 2017 04:20 )             26.3     06-23    127<L>  |  94<L>  |  43<H>  ----------------------------<  117<H>  4.6   |  19<L>  |  1.44<H>    Ca    8.4      2017 10:50  Phos  3.3     06-  Mg     2.2     06-23    TPro  5.0<L>  /  Alb  2.8<L>  /  TBili  0.4  /  DBili  x   /  AST  25  /  ALT  24  /  AlkPhos  56  06-22    Urinalysis Basic - ( 2017 16:34 )    Color: PLYEL / Appearance: CLEAR / S.013 / pH: 5.5  Gluc: NEGATIVE / Ketone: NEGATIVE  / Bili: NEGATIVE / Urobili: NORMAL E.U.   Blood: SMALL / Protein: 20 / Nitrite: NEGATIVE   Leuk Esterase: NEGATIVE / RBC: 2-5 / WBC 2-5   Sq Epi: OCC / Non Sq Epi: x / Bacteria: FEW      Microbiology:     parvo PCR pending  IgG/IgM +      Consultant(s) Notes Reviewed:  renal    Care Discussed with Consultants/Other Providers:

## 2017-06-24 LAB
BUN SERPL-MCNC: 39 MG/DL — HIGH (ref 7–23)
CALCIUM SERPL-MCNC: 8.5 MG/DL — SIGNIFICANT CHANGE UP (ref 8.4–10.5)
CHLORIDE SERPL-SCNC: 97 MMOL/L — LOW (ref 98–107)
CO2 SERPL-SCNC: 17 MMOL/L — LOW (ref 22–31)
CREAT SERPL-MCNC: 1.24 MG/DL — SIGNIFICANT CHANGE UP (ref 0.5–1.3)
GLUCOSE SERPL-MCNC: 96 MG/DL — SIGNIFICANT CHANGE UP (ref 70–99)
MAGNESIUM SERPL-MCNC: 2.3 MG/DL — SIGNIFICANT CHANGE UP (ref 1.6–2.6)
PHOSPHATE SERPL-MCNC: 3.5 MG/DL — SIGNIFICANT CHANGE UP (ref 2.5–4.5)
POTASSIUM SERPL-MCNC: 4.5 MMOL/L — SIGNIFICANT CHANGE UP (ref 3.5–5.3)
POTASSIUM SERPL-SCNC: 4.5 MMOL/L — SIGNIFICANT CHANGE UP (ref 3.5–5.3)
SODIUM SERPL-SCNC: 130 MMOL/L — LOW (ref 135–145)

## 2017-06-24 PROCEDURE — 99233 SBSQ HOSP IP/OBS HIGH 50: CPT | Mod: GC

## 2017-06-24 PROCEDURE — 99233 SBSQ HOSP IP/OBS HIGH 50: CPT

## 2017-06-24 RX ORDER — ACETAMINOPHEN 500 MG
650 TABLET ORAL ONCE
Qty: 0 | Refills: 0 | Status: COMPLETED | OUTPATIENT
Start: 2017-06-24 | End: 2017-06-24

## 2017-06-24 RX ADMIN — Medication 20 MILLIGRAM(S): at 05:03

## 2017-06-24 NOTE — PROGRESS NOTE ADULT - ASSESSMENT
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      1. 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    hypervolemic hyponatremia, :  improving   repeat labs in am       3. Edema, unspecified type.    improving   -f/u renal recs  -f/u TTE pending   -daily weights.     4.Anemia, unspecified type.    no active bleeding   monitor Hb daily       5.Hypercholesteremia.    consider statin but can be started as out patient       Gi and DVT prophylaxis     possible dc in am if sodium improves

## 2017-06-24 NOTE — DISCHARGE NOTE ADULT - CARE PROVIDERS DIRECT ADDRESSES
[email or web address]_Unknown

## 2017-06-24 NOTE — PROGRESS NOTE ADULT - SUBJECTIVE AND OBJECTIVE BOX
62y old  Female who presents with a chief complaint of N/V hyponatremia      SUBJECTIVE / OVERNIGHT EVENTS:    patient seen and examine at bed side   feeling better   no nausea/ vomiting and diarrhea     MEDICATIONS  (STANDING):  heparin  Injectable 5000Unit(s) SubCutaneous every 12 hours  furosemide    Tablet 20milliGRAM(s) Oral daily    MEDICATIONS  (PRN):  ondansetron Injectable 4milliGRAM(s) IV Push every 8 hours PRN Nausea and/or Vomiting  acetaminophen   Tablet. 650milliGRAM(s) Oral every 6 hours PRN Mild Pain (1 - 3)      Vital Signs Last 24 Hrs  T(C): 36.8, Max: 37.4 ( @ 05:01)  T(F): 98.3, Max: 99.3 ( @ 05:01)  HR: 74 (62 - 80)  BP: 146/90 (125/67 - 147/86)  BP(mean): --  RR: 16 (16 - 17)  SpO2: 97% (97% - 100%)        PHYSICAL EXAM:  GENERAL: NAD  HEAD:  Atraumatic, Normocephalic  EYES: sclera appear mildly icteric/yellow (however TB wnl), EOMI, b/l subjunctional hemorrhages   NECK: Supple, No JVD  CHEST/LUNG: Clear to auscultation bilaterally; No wheeze, decreased BS at bases   HEART: Regular rate and rhythm; No murmurs, rubs, or gallops  ABDOMEN: mild distention, no r/g, soft   EXTREMITIES:   wwp, trace pedal edema  PSYCH: AAOx3  NEUROLOGY: non-focal      Urinalysis Basic - ( 2017 16:34 )    Color: PLYEL / Appearance: CLEAR / S.013 / pH: 5.5  Gluc: NEGATIVE / Ketone: NEGATIVE  / Bili: NEGATIVE / Urobili: NORMAL E.U.   Blood: SMALL / Protein: 20 / Nitrite: NEGATIVE   Leuk Esterase: NEGATIVE / RBC: 2-5 / WBC 2-5   Sq Epi: OCC / Non Sq Epi: x / Bacteria: FEW          130<L>  |  97<L>  |  39<H>  ----------------------------<  96  4.5   |  17<L>  |  1.24    Ca    8.5      2017 08:00  Phos  3.5     06-24  Mg     2.3     06-24                          9.2    4.05  )-----------( 171      ( 2017 04:20 )             26.3     Microbiology:     parvo PCR pending  IgG/IgM +    Consultant(s) Notes Reviewed:  renal

## 2017-06-24 NOTE — H&P ADULT - PROBLEM SELECTOR PLAN 4
Follow up with PMD 2 days and with GI this week.  Dr [phone] Dr Pond- has appt this Thursday CXR no pul edema, proBNP non significant according to Pt age, CHF exacerbation less likely. Clinically CXR no Pul edema, Pt ETHEL with poor oral intake, CHF exacerbation less likely. will hold off diuretics, obtain ECHO for further cardiac function evaluation.   b/l LE mild nonpitting edema, no calf tenderness/or erythema, DVT less likely.

## 2017-06-24 NOTE — PROGRESS NOTE ADULT - ATTENDING COMMENTS
Overall, better, if she needs outpatient follow up, can follow up with anyone of us at our office in 1-2 weeks at [address] [place] [address]( gave info to daughter).  Pascale Rahman MD  Cell   Pager   Office

## 2017-06-24 NOTE — PROGRESS NOTE ADULT - PROBLEM SELECTOR PLAN 1
some NSAIDs use. it could be hemodynamic or AIN related to parvo. minimal proteinuria argues against Nephrotic or nephritic process..   low compliments likely related to parvo but not GN. This has been reported with parvovirus infection. The duration of hypocomplementemia ranged 11–68 days in recent case series.  Actually, hypocomplementemia is an early diagnostic marker of parvovirus B19 infection in adults, and measurement of serum complement levels is a useful test for differential diagnosis of adult patients with suspected parvovirus B19 infection.(http://onlinelibrary.kang.com/doi/10.1111/3452-8177.29021/full)

## 2017-06-24 NOTE — DISCHARGE NOTE ADULT - PROVIDER TOKENS
TOKEN:'[id number]' TOKEN:'[id number]',FREE:[LAST:[Kyrie],PHONE:[phone]],FAX:[(   )    -]]

## 2017-06-24 NOTE — DISCHARGE NOTE ADULT - MEDICATION SUMMARY - MEDICATIONS TO TAKE
I will START or STAY ON the medications listed below when I get home from the hospital:    furosemide 20 mg oral tablet  -- 1 tab(s) by mouth once a day  -- Indication: For Acute kidney injury    ferrous sulfate 325 mg (65 mg elemental iron) oral tablet  -- 1 tab(s) by mouth once a day  -- Check with your doctor before becoming pregnant.  Do not chew, break, or crush.  May discolor urine or feces.    -- Indication: For Anemia, unspecified type

## 2017-06-24 NOTE — PROGRESS NOTE ADULT - ASSESSMENT
A 62 year old Female with PMH HLD( not on medication) who presented with headaches, nausea, acute onset lower extremity edema, and has parvovirus b19 infection + with ETHEL and hyponatremia. Given no significant nephrotic syndrome, this is likely pre renal ETHEL from acute infection of any viral etiology. Hyponatremia also likely was pre renal in nature.

## 2017-06-24 NOTE — PROGRESS NOTE ADULT - PROBLEM SELECTOR PLAN 2
-stable  -continue fluid restriction, liberalize PO salt and protein intake  -patient education on fluid intake, sucking on ice

## 2017-06-24 NOTE — DISCHARGE NOTE ADULT - PLAN OF CARE
resolved Please call to make an outpt f/u w/ renal 1-2 weeks at [address] [place] [address] Pascale Rahman MD  Avoid taking NSAID such as Motrin, Advil or Aleve -continue fluid restriction 1.5 L, liberalize PO salt and protein intake  Sodium level on discharge was_________ stable H/H Lipid control Na level to be btw 135-145 Continue taking iron supplements as prescribed. Follow up with your PCP for further management and evaluation. Follow up with your PCP for further management and evaluation Please follow up out patient with Dr. Rodriguez  and Dr. Rahman  at [address] [place] [address] . An appointment was made for Wednesday 6/28/17.    Avoid taking NSAID such as Motrin, Advil or Aleve. Continue fluid restriction 1.5 L, liberalize PO salt and protein intake  Sodium level on discharge was 133 Continue taking iron supplements as prescribed. Follow up with Dr. Mittal [phone] for further management and evaluation. Follow up with Dr. Mittal [phone] for further management and evaluation Please follow up out patient with Dr. Rodriguez and Dr. Rahman  at [address] [place] [address] . An appointment was made for Wednesday 6/28/17. Avoid taking NSAID such as Motrin, Advil or Aleve. Continue fluid restriction 1.5 L, liberalize PO salt and protein intake Sodium level on discharge was 133. Please follow up with Dr. Rodriguez and Dr. Rahman  for further medical management.

## 2017-06-24 NOTE — PROGRESS NOTE ADULT - SUBJECTIVE AND OBJECTIVE BOX
[facility] DIVISION OF KIDNEY DISEASES AND HYPERTENSION -- FOLLOW UP NOTE  --------------------------------------------------------------------------------  Chief Complaint: feels much better    24 hour events/subjective:  no changes, crt downtrending, Na better      PAST HISTORY  --------------------------------------------------------------------------------  No significant changes to PMH, PSH, FHx, SHx, unless otherwise noted    ALLERGIES & MEDICATIONS  --------------------------------------------------------------------------------  Allergies    No Known Allergies    Intolerances      Standing Inpatient Medications  heparin  Injectable 5000Unit(s) SubCutaneous every 12 hours  furosemide    Tablet 20milliGRAM(s) Oral daily    PRN Inpatient Medications  ondansetron Injectable 4milliGRAM(s) IV Push every 8 hours PRN  acetaminophen   Tablet. 650milliGRAM(s) Oral every 6 hours PRN      REVIEW OF SYSTEMS  --------------------------------------------------------------------------------  Gen: No weight changes, fatigue, fevers/chills, weakness  Skin: No rashes  Head/Eyes/Ears/Mouth: No headache; Normal hearing; Normal vision w/o blurriness; No sinus pain/discomfort, sore throat  Respiratory: No dyspnea, cough, wheezing, hemoptysis  CV: No chest pain, PND, orthopnea  GI: No abdominal pain, diarrhea, constipation, nausea, vomiting, melena, hematochezia  : No increased frequency, dysuria, hematuria, nocturia  MSK: No joint pain/swelling; no back pain; no edema  Neuro: No dizziness/lightheadedness, weakness, seizures, numbness, tingling  Heme: No easy bruising or bleeding  Endo: No heat/cold intolerance  Psych: No significant nervousness, anxiety, stress, depression    All other systems were reviewed and are negative, except as noted.    VITALS/PHYSICAL EXAM  --------------------------------------------------------------------------------  T(C): 36.8, Max: 37.4 (06-24 @ 05:01)  HR: 74 (62 - 80)  BP: 146/90 (125/67 - 147/86)  RR: 16 (16 - 17)  SpO2: 97% (97% - 100%)  Wt(kg): --        I & Os for current day (as of 06-24-17 @ 13:05)  =============================================  IN: 1410 ml / OUT: 1180 ml / NET: 230 ml    Physical Exam:  	Gen: NAD, well-appearing  	HEENT: PERRL, supple neck, clear oropharynx  	Pulm: CTA B/L  	CV: RRR, S1S2; no rub  	Back: No spinal or CVA tenderness; no sacral edema  	Abd: +BS, soft, nontender/nondistended  	: No suprapubic tenderness  	UE: Warm, FROM, no clubbing, intact strength; no edema; no asterixis  	LE: Warm, FROM, no clubbing, intact strength; no edema  	Neuro: No focal deficits, intact gait  	Psych: Normal affect and mood  	Skin: Warm, without rashes  	Vascular access:    LABS/STUDIES  --------------------------------------------------------------------------------              9.2    4.05  >-----------<  171      [06-23-17 @ 04:20]              26.3     130  |  97  |  39  ----------------------------<  96      [06-24-17 @ 08:00]  4.5   |  17  |  1.24        Ca     8.5     [06-24-17 @ 08:00]      Mg     2.3     [06-24-17 @ 08:00]      Phos  3.5     [06-24-17 @ 08:00]            Creatinine Trend:  SCr 1.24 [06-24 @ 08:00]  SCr 1.44 [06-23 @ 10:50]  SCr 1.41 [06-23 @ 04:20]  SCr 1.46 [06-22 @ 22:10]  SCr 1.60 [06-22 @ 15:39]    Urinalysis - [06-22-17 @ 16:34]      Color PLYEL / Appearance CLEAR / SG 1.013 / pH 5.5      Gluc NEGATIVE / Ketone NEGATIVE  / Bili NEGATIVE / Urobili NORMAL       Blood SMALL / Protein 20 / Leuk Est NEGATIVE / Nitrite NEGATIVE      RBC 2-5 / WBC 2-5 / Hyaline 2-5 / Gran  / Sq Epi OCC / Non Sq Epi  / Bacteria FEW    Urine Creatinine 96.50      [06-22-17 @ 22:10]  Urine Protein 15.0      [06-22-17 @ 22:10]  Urine Sodium 9      [06-20-17 @ 13:25]  Urine Potassium 60.0      [06-20-17 @ 13:25]  Urine Chloride 11      [06-20-17 @ 13:25]  Urine Osmolality 376      [06-22-17 @ 03:00]    Iron 36, TIBC 197, %sat --      [06-22-17 @ 04:00]  Ferritin 165.6      [06-22-17 @ 04:00]  HbA1c 5.8      [06-21-17 @ 06:00]  TSH 2.74      [06-21-17 @ 06:00]  Lipid: chol 161, , HDL 23,       [06-21-17 @ 06:00]    HBsAg Nonreactive      [06-21-17 @ 15:19]  HCV 0.18, Nonreactive Hepatitis C AB  S/CO Ratio                        Interpretation  < 1.0                                     Non-Reactive  1.0 - 4.9                           Weakly-Reactive  > 5.0                                 Reactive  Non-Reactive: A      [06-21-17 @ 15:19]    BRIDGER: titer Negative, pattern --      [06-21-17 @ 15:19]  dsDNA <12      [06-21-17 @ 15:19]  C3 Complement 48.5      [06-21-17 @ 15:19]  C4 Complement 1.5      [06-21-17 @ 15:19]  Rheumatoid Factor 8.1      [06-21-17 @ 15:19]  ANCA: cANCA Negative, pANCA Negative, atypical ANCA --      [06-21-17 @ 15:19]  anti-GBM <0.2      [06-21-17 @ 15:19]

## 2017-06-24 NOTE — DISCHARGE NOTE ADULT - HOSPITAL COURSE
61 y/o female h/o hld, appy here with n/v x2 today, dec urine output x5 days, mild frontal ha similar in nature to prior mckinnon's.  Pt states that she had a viral syndrome with uri sx about 2 weeks ago, sick contact in family member with same sx.     Hospital Course:   Hyponatremia  -s/p IV fluids with worsening serum sodium and edema: hold IVF  -Fluid restriction less than 1.5 L per day  -6/20 lasix 20mg IVP x 1, started daily   -Avoid nephrotoxic, NSAIDs And RCAs.  -6/21 sodium chloride 1.5 started   Outpt f/u w/ renal 1-2 weeks at [address] [place] [address] Pascale Rahman MD      ETHEL   -c/w parvovirus induced GN, DsDNA negative, no history of recent catheterization  -renal biopsy is a consideration  if renal function does not improve any further-creatinine   -completed IVF x 24 hrs, monitor NA   -hepatitis panel--- negative   follow up out pt with Dr. Rahman     CXR:Left lower lobe atelectasis and/or pneumonia  RVP neg   Parvovirus pos : droplet precautions     Dispo: Home

## 2017-06-24 NOTE — DISCHARGE NOTE ADULT - CARE PLAN
Principal Discharge DX:	Acute kidney injury  Goal:	resolved  Instructions for follow-up, activity and diet:	Please call to make an outpt f/u w/ renal 1-2 weeks at [address] [place] [address] Pascale Rahman MD  Avoid taking NSAID such as Motrin, Advil or Aleve  Secondary Diagnosis:	Hyponatremia  Goal:	Na level to be btw 135-145  Instructions for follow-up, activity and diet:	-continue fluid restriction 1.5 L, liberalize PO salt and protein intake  Sodium level on discharge was_________  Secondary Diagnosis:	Anemia, unspecified type  Goal:	stable H/H  Secondary Diagnosis:	Hypercholesteremia  Goal:	Lipid control Principal Discharge DX:	Acute kidney injury  Goal:	resolved  Instructions for follow-up, activity and diet:	Please follow up out patient with Dr. Rodriguez  and Dr. Rahman  at [address] [place] [address] . An appointment was made for Wednesday 6/28/17.    Avoid taking NSAID such as Motrin, Advil or Aleve.  Secondary Diagnosis:	Hyponatremia  Goal:	Na level to be btw 135-145  Instructions for follow-up, activity and diet:	Continue fluid restriction 1.5 L, liberalize PO salt and protein intake  Sodium level on discharge was 133  Secondary Diagnosis:	Anemia, unspecified type  Goal:	stable H/H  Instructions for follow-up, activity and diet:	Continue taking iron supplements as prescribed. Follow up with your PCP for further management and evaluation.  Secondary Diagnosis:	Hypercholesteremia  Goal:	Lipid control  Instructions for follow-up, activity and diet:	Follow up with your PCP for further management and evaluation Principal Discharge DX:	Acute kidney injury  Goal:	resolved  Instructions for follow-up, activity and diet:	Please follow up out patient with Dr. Rodriguez and Dr. Rahman  at [address] [place] [address] . An appointment was made for Wednesday 6/28/17. Avoid taking NSAID such as Motrin, Advil or Aleve.  Secondary Diagnosis:	Hyponatremia  Goal:	Na level to be btw 135-145  Instructions for follow-up, activity and diet:	Continue fluid restriction 1.5 L, liberalize PO salt and protein intake Sodium level on discharge was 133. Please follow up with Dr. Rodriguez and Dr. Rahman  for further medical management.  Secondary Diagnosis:	Anemia, unspecified type  Goal:	stable H/H  Instructions for follow-up, activity and diet:	Continue taking iron supplements as prescribed. Follow up with Dr. Mittal [phone] for further management and evaluation.  Secondary Diagnosis:	Hypercholesteremia  Goal:	Lipid control  Instructions for follow-up, activity and diet:	Follow up with Dr. Mittal [phone] for further management and evaluation Principal Discharge DX:	Acute kidney injury  Goal:	resolved  Instructions for follow-up, activity and diet:	Please follow up out patient with Dr. Rodriguez and Dr. Rahman  at [address] [place] [address] . An appointment was made for Wednesday 6/28/17. Avoid taking NSAID such as Motrin, Advil or Aleve.  Secondary Diagnosis:	Hyponatremia  Goal:	Na level to be btw 135-145  Instructions for follow-up, activity and diet:	Continue fluid restriction 1.5 L, liberalize PO salt and protein intake Sodium level on discharge was 133. Please follow up with Dr. Rodriguez and Dr. Rahman  for further medical management.  Secondary Diagnosis:	Anemia, unspecified type  Goal:	stable H/H  Instructions for follow-up, activity and diet:	Continue taking iron supplements as prescribed. Follow up with Dr. Mittal [phone] for further management and evaluation.  Secondary Diagnosis:	Hypercholesteremia  Goal:	Lipid control  Instructions for follow-up, activity and diet:	Follow up with Dr. Mittal [phone] for further management and evaluation Principal Discharge DX:	Acute kidney injury  Goal:	resolved  Instructions for follow-up, activity and diet:	Please follow up out patient with Dr. Rodriguez and Dr. Rahman  at [address] [place] [address] . An appointment was made for Wednesday 6/28/17. Avoid taking NSAID such as Motrin, Advil or Aleve.  Secondary Diagnosis:	Hyponatremia  Goal:	Na level to be btw 135-145  Instructions for follow-up, activity and diet:	Continue fluid restriction 1.5 L, liberalize PO salt and protein intake Sodium level on discharge was 133. Please follow up with Dr. Rodriguez and Dr. Rahman  for further medical management.  Secondary Diagnosis:	Anemia, unspecified type  Goal:	stable H/H  Instructions for follow-up, activity and diet:	Continue taking iron supplements as prescribed. Follow up with Dr. Mittal [phone] for further management and evaluation.  Secondary Diagnosis:	Hypercholesteremia  Goal:	Lipid control  Instructions for follow-up, activity and diet:	Follow up with Dr. Mittal [phone] for further management and evaluation

## 2017-06-24 NOTE — DISCHARGE NOTE ADULT - CARE PROVIDER_API CALL
Pascale Rahman), Internal Medicine; Nephrology  [address] [place] [address]  Phone: [phone]  Fax: [phone] Pascale Rahman), Internal Medicine; Nephrology  [address] [place] [address]  Phone: [phone]  Fax: [phone]    Kyrie,   Phone: [phone]  Fax: (       -

## 2017-06-24 NOTE — DISCHARGE NOTE ADULT - PATIENT PORTAL LINK FT
“You can access the Follow[facility] Patient Portal, offered by [facility], by registering with the following website: [email or web address]”

## 2017-06-25 VITALS
OXYGEN SATURATION: 100 % | RESPIRATION RATE: 16 BRPM | TEMPERATURE: 99 F | HEART RATE: 74 BPM | SYSTOLIC BLOOD PRESSURE: 153 MMHG | DIASTOLIC BLOOD PRESSURE: 91 MMHG

## 2017-06-25 LAB
BUN SERPL-MCNC: 43 MG/DL — HIGH (ref 7–23)
BUN SERPL-MCNC: 43 MG/DL — HIGH (ref 7–23)
CALCIUM SERPL-MCNC: 8.7 MG/DL — SIGNIFICANT CHANGE UP (ref 8.4–10.5)
CALCIUM SERPL-MCNC: 8.7 MG/DL — SIGNIFICANT CHANGE UP (ref 8.4–10.5)
CHLORIDE SERPL-SCNC: 101 MMOL/L — SIGNIFICANT CHANGE UP (ref 98–107)
CHLORIDE SERPL-SCNC: 101 MMOL/L — SIGNIFICANT CHANGE UP (ref 98–107)
CO2 SERPL-SCNC: 18 MMOL/L — LOW (ref 22–31)
CO2 SERPL-SCNC: 18 MMOL/L — LOW (ref 22–31)
CREAT SERPL-MCNC: 1.34 MG/DL — HIGH (ref 0.5–1.3)
CREAT SERPL-MCNC: 1.34 MG/DL — HIGH (ref 0.5–1.3)
GLUCOSE SERPL-MCNC: 97 MG/DL — SIGNIFICANT CHANGE UP (ref 70–99)
GLUCOSE SERPL-MCNC: 97 MG/DL — SIGNIFICANT CHANGE UP (ref 70–99)
MAGNESIUM SERPL-MCNC: 2.6 MG/DL — SIGNIFICANT CHANGE UP (ref 1.6–2.6)
PHOSPHATE SERPL-MCNC: 3.9 MG/DL — SIGNIFICANT CHANGE UP (ref 2.5–4.5)
POTASSIUM SERPL-MCNC: 4.7 MMOL/L — SIGNIFICANT CHANGE UP (ref 3.5–5.3)
POTASSIUM SERPL-MCNC: 4.7 MMOL/L — SIGNIFICANT CHANGE UP (ref 3.5–5.3)
POTASSIUM SERPL-SCNC: 4.7 MMOL/L — SIGNIFICANT CHANGE UP (ref 3.5–5.3)
POTASSIUM SERPL-SCNC: 4.7 MMOL/L — SIGNIFICANT CHANGE UP (ref 3.5–5.3)
SODIUM SERPL-SCNC: 133 MMOL/L — LOW (ref 135–145)
SODIUM SERPL-SCNC: 133 MMOL/L — LOW (ref 135–145)

## 2017-06-25 PROCEDURE — 99233 SBSQ HOSP IP/OBS HIGH 50: CPT | Mod: GC

## 2017-06-25 PROCEDURE — 99239 HOSP IP/OBS DSCHRG MGMT >30: CPT

## 2017-06-25 RX ORDER — FUROSEMIDE 40 MG
1 TABLET ORAL
Qty: 30 | Refills: 0 | OUTPATIENT
Start: 2017-06-25 | End: 2017-07-25

## 2017-06-25 RX ORDER — FERROUS SULFATE 325(65) MG
1 TABLET ORAL
Qty: 30 | Refills: 0 | OUTPATIENT
Start: 2017-06-25 | End: 2017-07-25

## 2017-06-25 RX ADMIN — Medication 20 MILLIGRAM(S): at 04:54

## 2017-06-25 NOTE — PROGRESS NOTE ADULT - SUBJECTIVE AND OBJECTIVE BOX
[facility] DIVISION OF KIDNEY DISEASES AND HYPERTENSION -- FOLLOW UP NOTE  --------------------------------------------------------------------------------  Chief Complaint:edema, ETHEL, viral prodrome    24 hour events/subjective: feels better, less swelling        PAST HISTORY  --------------------------------------------------------------------------------  No significant changes to PMH, PSH, FHx, SHx, unless otherwise noted    ALLERGIES & MEDICATIONS  --------------------------------------------------------------------------------  Allergies    No Known Allergies    Intolerances      Standing Inpatient Medications  heparin  Injectable 5000Unit(s) SubCutaneous every 12 hours  furosemide    Tablet 20milliGRAM(s) Oral daily    PRN Inpatient Medications  ondansetron Injectable 4milliGRAM(s) IV Push every 8 hours PRN  acetaminophen   Tablet. 650milliGRAM(s) Oral every 6 hours PRN      REVIEW OF SYSTEMS  --------------------------------------------------------------------------------  Gen: No weight changes, fatigue, fevers/chills, weakness  Skin: No rashes  Head/Eyes/Ears/Mouth: No headache; Normal hearing; Normal vision w/o blurriness; No sinus pain/discomfort, sore throat  Respiratory: No dyspnea, cough, wheezing, hemoptysis  CV: No chest pain, PND, orthopnea  GI: No abdominal pain, diarrhea, constipation, nausea, vomiting, melena, hematochezia  : No increased frequency, dysuria, hematuria, nocturia  MSK: No joint pain/swelling; no back pain; no edema  Neuro: No dizziness/lightheadedness, weakness, seizures, numbness, tingling  Heme: No easy bruising or bleeding  Endo: No heat/cold intolerance  Psych: No significant nervousness, anxiety, stress, depression    All other systems were reviewed and are negative, except as noted.    VITALS/PHYSICAL EXAM  --------------------------------------------------------------------------------  T(C): 36.8, Max: 37.3 (06-24 @ 20:51)  HR: 71 (71 - 76)  BP: 130/58 (130/58 - 151/83)  RR: 17 (17 - 18)  SpO2: 100% (99% - 100%)  Wt(kg): --        I & Os for current day (as of 06-25-17 @ 12:56)  =============================================  IN: 1348 ml / OUT: 1280 ml / NET: 68 ml    Physical Exam:  	Gen: NAD, well-appearing  	HEENT: PERRL, supple neck, clear oropharynx  	Pulm: CTA B/L  	CV: RRR, S1S2; no rub  	Back: No spinal or CVA tenderness; no sacral edema  	Abd: +BS, soft, nontender/nondistended  	: No suprapubic tenderness  	UE: Warm, FROM, no clubbing, intact strength; no edema; no asterixis  	LE: Warm, FROM, no clubbing, intact strength; no edema  	Neuro: No focal deficits, intact gait  	Psych: Normal affect and mood  	Skin: Warm, without rashes  	Vascular access:    LABS/STUDIES  --------------------------------------------------------------------------------    133  |  101  |  43  ----------------------------<  97      [06-25-17 @ 06:00]  4.7   |  18  |  1.34        Ca     8.7     [06-25-17 @ 06:00]      Mg     2.6     [06-25-17 @ 06:00]      Phos  3.9     [06-25-17 @ 06:00]            Creatinine Trend:  SCr 1.34 [06-25 @ 06:00]  SCr 1.24 [06-24 @ 08:00]  SCr 1.44 [06-23 @ 10:50]  SCr 1.41 [06-23 @ 04:20]  SCr 1.46 [06-22 @ 22:10]    Urinalysis - [06-22-17 @ 16:34]      Color PLYEL / Appearance CLEAR / SG 1.013 / pH 5.5      Gluc NEGATIVE / Ketone NEGATIVE  / Bili NEGATIVE / Urobili NORMAL       Blood SMALL / Protein 20 / Leuk Est NEGATIVE / Nitrite NEGATIVE      RBC 2-5 / WBC 2-5 / Hyaline 2-5 / Gran  / Sq Epi OCC / Non Sq Epi  / Bacteria FEW    Urine Creatinine 96.50      [06-22-17 @ 22:10]  Urine Protein 15.0      [06-22-17 @ 22:10]  Urine Sodium 9      [06-20-17 @ 13:25]  Urine Potassium 60.0      [06-20-17 @ 13:25]  Urine Chloride 11      [06-20-17 @ 13:25]  Urine Osmolality 376      [06-22-17 @ 03:00]    Iron 36, TIBC 197, %sat --      [06-22-17 @ 04:00]  Ferritin 165.6      [06-22-17 @ 04:00]  HbA1c 5.8      [06-21-17 @ 06:00]  TSH 2.74      [06-21-17 @ 06:00]  Lipid: chol 161, , HDL 23,       [06-21-17 @ 06:00]    HBsAg Nonreactive      [06-21-17 @ 15:19]  HCV 0.18, Nonreactive Hepatitis C AB  S/CO Ratio                        Interpretation  < 1.0                                     Non-Reactive  1.0 - 4.9                           Weakly-Reactive  > 5.0                                 Reactive  Non-Reactive: A      [06-21-17 @ 15:19]    BRIDGER: titer Negative, pattern --      [06-21-17 @ 15:19]  dsDNA <12      [06-21-17 @ 15:19]  C3 Complement 48.5      [06-21-17 @ 15:19]  C4 Complement 1.5      [06-21-17 @ 15:19]  Rheumatoid Factor 8.1      [06-21-17 @ 15:19]  ANCA: cANCA Negative, pANCA Negative, atypical ANCA --      [06-21-17 @ 15:19]  anti-GBM <0.2      [06-21-17 @ 15:19] [facility] DIVISION OF KIDNEY DISEASES AND HYPERTENSION -- FOLLOW UP NOTE  --------------------------------------------------------------------------------  Chief Complaint:edema, ETHEL, viral prodrome    24 hour events/subjective: feels better, less swelling        PAST HISTORY  --------------------------------------------------------------------------------  No significant changes to PMH, PSH, FHx, SHx, unless otherwise noted    ALLERGIES & MEDICATIONS  --------------------------------------------------------------------------------  Allergies    No Known Allergies    Intolerances      Standing Inpatient Medications  heparin  Injectable 5000Unit(s) SubCutaneous every 12 hours  furosemide    Tablet 20milliGRAM(s) Oral daily    PRN Inpatient Medications  ondansetron Injectable 4milliGRAM(s) IV Push every 8 hours PRN  acetaminophen   Tablet. 650milliGRAM(s) Oral every 6 hours PRN      REVIEW OF SYSTEMS  --------------------------------------------------------------------------------  Gen: No weight changes, fatigue, fevers/chills, weakness  Skin: No rashes  CV: No chest pain, PND, orthopnea    All other systems were reviewed and are negative, except as noted.    VITALS/PHYSICAL EXAM  --------------------------------------------------------------------------------  T(C): 36.8, Max: 37.3 (06-24 @ 20:51)  HR: 71 (71 - 76)  BP: 130/58 (130/58 - 151/83)  RR: 17 (17 - 18)  SpO2: 100% (99% - 100%)  Wt(kg): --        I & Os for current day (as of 06-25-17 @ 12:56)  =============================================  IN: 1348 ml / OUT: 1280 ml / NET: 68 ml    Physical Exam:  	Gen: NAD, well-appearing  	HEENT: PERRL, supple neck, clear oropharynx  	Pulm: CTA B/L  	CV: RRR, S1S2; no rub  	Back: No spinal or CVA tenderness; no sacral edema  	Abd: +BS, soft, nontender/nondistended  	: No suprapubic tenderness               LE: Warm, + edema  	Neuro: No focal deficits, intact gait  	Psych: Normal affect and mood  	Skin: Warm, without rashes  	:    LABS/STUDIES  --------------------------------------------------------------------------------    133  |  101  |  43  ----------------------------<  97      [06-25-17 @ 06:00]  4.7   |  18  |  1.34        Ca     8.7     [06-25-17 @ 06:00]      Mg     2.6     [06-25-17 @ 06:00]      Phos  3.9     [06-25-17 @ 06:00]            Creatinine Trend:  SCr 1.34 [06-25 @ 06:00]  SCr 1.24 [06-24 @ 08:00]  SCr 1.44 [06-23 @ 10:50]  SCr 1.41 [06-23 @ 04:20]  SCr 1.46 [06-22 @ 22:10]    Urinalysis - [06-22-17 @ 16:34]      Color PLYEL / Appearance CLEAR / SG 1.013 / pH 5.5      Gluc NEGATIVE / Ketone NEGATIVE  / Bili NEGATIVE / Urobili NORMAL       Blood SMALL / Protein 20 / Leuk Est NEGATIVE / Nitrite NEGATIVE      RBC 2-5 / WBC 2-5 / Hyaline 2-5 / Gran  / Sq Epi OCC / Non Sq Epi  / Bacteria FEW    Urine Creatinine 96.50      [06-22-17 @ 22:10]  Urine Protein 15.0      [06-22-17 @ 22:10]  Urine Sodium 9      [06-20-17 @ 13:25]  Urine Potassium 60.0      [06-20-17 @ 13:25]  Urine Chloride 11      [06-20-17 @ 13:25]  Urine Osmolality 376      [06-22-17 @ 03:00]    Iron 36, TIBC 197, %sat --      [06-22-17 @ 04:00]  Ferritin 165.6      [06-22-17 @ 04:00]  HbA1c 5.8      [06-21-17 @ 06:00]  TSH 2.74      [06-21-17 @ 06:00]  Lipid: chol 161, , HDL 23,       [06-21-17 @ 06:00]    HBsAg Nonreactive      [06-21-17 @ 15:19]  HCV 0.18, Nonreactive Hepatitis C AB  S/CO Ratio                        Interpretation  < 1.0                                     Non-Reactive  1.0 - 4.9                           Weakly-Reactive  > 5.0                                 Reactive  Non-Reactive: A      [06-21-17 @ 15:19]    BRIDGER: titer Negative, pattern --      [06-21-17 @ 15:19]  dsDNA <12      [06-21-17 @ 15:19]  C3 Complement 48.5      [06-21-17 @ 15:19]  C4 Complement 1.5      [06-21-17 @ 15:19]  Rheumatoid Factor 8.1      [06-21-17 @ 15:19]  ANCA: cANCA Negative, pANCA Negative, atypical ANCA --      [06-21-17 @ 15:19]  anti-GBM <0.2      [06-21-17 @ 15:19]

## 2017-06-25 NOTE — PROGRESS NOTE ADULT - PROBLEM SELECTOR PLAN 2
-stable  -continue fluid restriction, liberalize PO salt and protein intake  -patient education on fluid intake, sucking on ice -stable  -continue fluid restriction, liberalize PO salt and protein intake  -patient education on fluid intake, sucking on ice  lasix 20 po daily

## 2017-06-25 NOTE — PROGRESS NOTE ADULT - ATTENDING COMMENTS
Overall, better, if she needs outpatient follow up, can follow up with anyone of us at our office in 1-2 weeks at [address] [place] [address]( gave info to daughter).  Pascale Rahman MD  Cell   Pager   Office  Overall, better, if she needs outpatient follow up, will follow up with me on Wednesday

## 2017-06-25 NOTE — PROGRESS NOTE ADULT - PROBLEM SELECTOR PROBLEM 2
Hyponatremia

## 2017-06-25 NOTE — PROGRESS NOTE ADULT - PROBLEM SELECTOR PLAN 1
some NSAIDs use. it could be hemodynamic or AIN related to parvo. minimal proteinuria argues against Nephrotic or nephritic process..   low compliments likely related to parvo but not GN. This has been reported with parvovirus infection. The duration of hypocomplementemia ranged 11–68 days in recent case series.  Actually, hypocomplementemia is an early diagnostic marker of parvovirus B19 infection in adults, and measurement of serum complement levels is a useful test for differential diagnosis of adult patients with suspected parvovirus B19 infection.(http://onlinelibrary.kang.com/doi/10.1111/1293-0430.48063/full) some NSAIDs use. it could be hemodynamic or AIN related to parvo. minimal proteinuria argues against Nephrotic   low compliments likely related to parvo but not GN. This has been reported with parvovirus infection. The duration of hypocomplementemia ranged 11–68 days in recent case series.  Actually, hypocomplementemia is an early diagnostic marker of parvovirus B19 infection in adults, and measurement of serum complement levels is a useful test for differential diagnosis of adult patients with suspected parvovirus B19 infection.(http://onlinelibrary.kang.com/doi/10.1111/2135-6220.20006/full)

## 2017-06-25 NOTE — PROGRESS NOTE ADULT - SUBJECTIVE AND OBJECTIVE BOX
62y old  Female who presents with a chief complaint of N/V hyponatremia      SUBJECTIVE / OVERNIGHT EVENTS:    patient seen and examine at bed side   feeling better   no nausea/ vomiting and diarrhea     MEDICATIONS  (STANDING):  heparin  Injectable 5000Unit(s) SubCutaneous every 12 hours  furosemide    Tablet 20milliGRAM(s) Oral daily    MEDICATIONS  (PRN):  ondansetron Injectable 4milliGRAM(s) IV Push every 8 hours PRN Nausea and/or Vomiting  acetaminophen   Tablet. 650milliGRAM(s) Oral every 6 hours PRN Mild Pain (1 - 3)      Vital Signs Last 24 Hrs  T(C): 36.8, Max: 37.3 ( @ 20:51)  T(F): 98.3, Max: 99.2 ( @ 20:51)  HR: 71 (71 - 76)  BP: 130/58 (130/58 - 151/83)  BP(mean): --  RR: 17 (17 - 18)  SpO2: 100% (99% - 100%)      PHYSICAL EXAM:  GENERAL: NAD  HEAD:  Atraumatic, Normocephalic  EYES: sclera appear mildly icteric/yellow (however TB wnl), EOMI, b/l subjunctional hemorrhages   NECK: Supple, No JVD  CHEST/LUNG: Clear to auscultation bilaterally; No wheeze, decreased BS at bases   HEART: Regular rate and rhythm; No murmurs, rubs, or gallops  ABDOMEN: mild distention, no r/g, soft   EXTREMITIES:   wwp, trace pedal edema  PSYCH: AAOx3  NEUROLOGY: non-focal      Urinalysis Basic - ( 2017 16:34 )    Color: PLYEL / Appearance: CLEAR / S.013 / pH: 5.5  Gluc: NEGATIVE / Ketone: NEGATIVE  / Bili: NEGATIVE / Urobili: NORMAL E.U.   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      1. 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    hypervolemic hyponatremia, :  improving   repeat labs in am       3. Edema, unspecified type.    improving   -f/u renal recs  -f/u TTE pending   -daily weights.     4.Anemia, unspecified type.    no active bleeding   monitor Hb daily       5.Hypercholesteremia.    consider statin but can be started as out patient       spoke to patient in detail ,she want to go home and want to f/u with nephro as out patient .   spoke to Nephro, ok to dc with out patient f/u   today creat still 1.34 and    she is on lasix po     plan : dc home with lasix 20 PO daily   nephro f/u on wednesday with dr palomino   repeat labs as out patient   anemia work up as out patient   ferrous sulphate 325 daily     plan d/w patient and she is agree with it       Gi and DVT prophylaxis     possible dc in am if sodium improves     Electronic Signatures:  Markos Jones)  (Signed 2017 11:33)  	Authored: Progress Note, Subjective and Objective, Assessment and Plan      Last Updated: 2017 11:33 by Markos Jones)

## 2017-06-25 NOTE — PROGRESS NOTE ADULT - ASSESSMENT
A 62 year old Female with PMH HLD( not on medication) who presented with headaches, nausea, acute onset lower extremity edema, and has parvovirus b19 infection + with ETHEL and hyponatremia. Given no significant nephrotic syndrome, this is likely pre renal ETHEL from acute infection of any viral etiology. Hyponatremia also likely was pre renal in nature. A 62 year old Female with PMH HLD( not on medication) who presented with headaches, nausea, acute onset lower extremity edema, and has parvovirus b19 infection + with ETHEL and hyponatremia.  this is likely pre renal ETHEL from acute infection of any viral etiology.

## 2017-06-26 PROBLEM — E78.00 PURE HYPERCHOLESTEROLEMIA, UNSPECIFIED: Chronic | Status: ACTIVE | Noted: 2017-06-20

## 2017-06-26 PROBLEM — Z00.00 ENCOUNTER FOR PREVENTIVE HEALTH EXAMINATION: Status: ACTIVE | Noted: 2017-06-26

## 2017-06-27 LAB — B19V DNA FLD QL NAA+PROBE: SIGNIFICANT CHANGE UP

## 2017-06-28 ENCOUNTER — APPOINTMENT (OUTPATIENT)
Dept: NEPHROLOGY | Facility: CLINIC | Age: 62
End: 2017-06-28

## 2017-06-28 VITALS — HEIGHT: 61 IN | WEIGHT: 139 LBS | HEART RATE: 73 BPM | OXYGEN SATURATION: 98 % | BODY MASS INDEX: 26.24 KG/M2

## 2017-06-28 VITALS — DIASTOLIC BLOOD PRESSURE: 88 MMHG | SYSTOLIC BLOOD PRESSURE: 166 MMHG

## 2017-06-28 DIAGNOSIS — E78.4 OTHER HYPERLIPIDEMIA: ICD-10-CM

## 2017-06-28 DIAGNOSIS — Z78.9 OTHER SPECIFIED HEALTH STATUS: ICD-10-CM

## 2017-06-28 DIAGNOSIS — I10 ESSENTIAL (PRIMARY) HYPERTENSION: ICD-10-CM

## 2017-06-28 RX ORDER — ASCORBIC ACID 1000 MG
250 TABLET ORAL
Refills: 0 | Status: ACTIVE | COMMUNITY
Start: 2017-06-28

## 2017-06-28 RX ORDER — BUSPIRONE HYDROCHLORIDE 10 MG/1
10 TABLET ORAL
Qty: 60 | Refills: 0 | Status: ACTIVE | COMMUNITY
Start: 2017-03-16

## 2017-06-28 RX ORDER — NIACIN 1000 MG
1000 TABLET, EXTENDED RELEASE ORAL
Refills: 0 | Status: ACTIVE | COMMUNITY
Start: 2017-06-28

## 2017-06-28 RX ORDER — FUROSEMIDE 20 MG/1
20 TABLET ORAL
Qty: 30 | Refills: 0 | Status: DISCONTINUED | COMMUNITY
Start: 2017-06-25 | End: 2017-06-28

## 2017-06-28 RX ORDER — UBIDECARENONE 60 MG
60 TABLET ORAL
Refills: 0 | Status: ACTIVE | COMMUNITY
Start: 2017-06-28

## 2017-06-28 RX ORDER — CLARITHROMYCIN 500 MG/1
500 TABLET, FILM COATED ORAL
Qty: 20 | Refills: 0 | Status: COMPLETED | COMMUNITY
Start: 2017-03-16

## 2017-06-28 RX ORDER — SOY ISOFLAVONE 40 MG
1000 TABLET ORAL
Refills: 0 | Status: DISCONTINUED | COMMUNITY
Start: 2017-06-28 | End: 2017-06-28

## 2017-06-28 RX ORDER — AZITHROMYCIN 250 MG/1
250 TABLET, FILM COATED ORAL
Qty: 6 | Refills: 0 | Status: COMPLETED | COMMUNITY
Start: 2017-06-01

## 2017-06-28 RX ORDER — ADHESIVE TAPE 3"X 2.3 YD
50 MCG TAPE, NON-MEDICATED TOPICAL
Refills: 0 | Status: ACTIVE | COMMUNITY
Start: 2017-06-28

## 2017-06-28 RX ORDER — BORON CITRATE 3 MG
3 TABLET ORAL
Refills: 0 | Status: DISCONTINUED | COMMUNITY
Start: 2017-06-28 | End: 2017-06-28

## 2017-06-29 DIAGNOSIS — R60.0 LOCALIZED EDEMA: ICD-10-CM

## 2017-06-29 LAB
ALBUMIN SERPL ELPH-MCNC: 3.8 G/DL
ANION GAP SERPL CALC-SCNC: 18 MMOL/L
APPEARANCE: CLEAR
BACTERIA: NEGATIVE
BASOPHILS # BLD AUTO: 0.05 K/UL
BASOPHILS NFR BLD AUTO: 1 %
BILIRUBIN URINE: NEGATIVE
BLOOD URINE: ABNORMAL
BUN SERPL-MCNC: 38 MG/DL
CALCIUM SERPL-MCNC: 9.5 MG/DL
CHLORIDE SERPL-SCNC: 106 MMOL/L
CO2 SERPL-SCNC: 19 MMOL/L
COLOR: YELLOW
CREAT SERPL-MCNC: 1.31 MG/DL
CREAT SPEC-SCNC: 87 MG/DL
CREAT/PROT UR: 0.2 RATIO
EOSINOPHIL # BLD AUTO: 0.2 K/UL
EOSINOPHIL NFR BLD AUTO: 3.8 %
FERRITIN SERPL-MCNC: 226 NG/ML
GLUCOSE QUALITATIVE U: NORMAL MG/DL
GLUCOSE SERPL-MCNC: 91 MG/DL
HCT VFR BLD CALC: 29.3 %
HGB BLD-MCNC: 9.9 G/DL
HYALINE CASTS: 3 /LPF
IMM GRANULOCYTES NFR BLD AUTO: 0.2 %
IRON SATN MFR SERPL: 17 %
IRON SERPL-MCNC: 39 UG/DL
KETONES URINE: NEGATIVE
LEUKOCYTE ESTERASE URINE: NEGATIVE
LYMPHOCYTES # BLD AUTO: 1.27 K/UL
LYMPHOCYTES NFR BLD AUTO: 24.4 %
MAN DIFF?: NORMAL
MCHC RBC-ENTMCNC: 30.9 PG
MCHC RBC-ENTMCNC: 33.8 GM/DL
MCV RBC AUTO: 91.6 FL
MICROSCOPIC-UA: NORMAL
MONOCYTES # BLD AUTO: 0.29 K/UL
MONOCYTES NFR BLD AUTO: 5.6 %
NEUTROPHILS # BLD AUTO: 3.39 K/UL
NEUTROPHILS NFR BLD AUTO: 65 %
NITRITE URINE: NEGATIVE
PH URINE: 5
PHOSPHATE SERPL-MCNC: 2.8 MG/DL
PLATELET # BLD AUTO: 275 K/UL
POTASSIUM SERPL-SCNC: 5 MMOL/L
PROT UR-MCNC: 15 MG/DL
PROTEIN URINE: NEGATIVE MG/DL
RBC # BLD: 3.2 M/UL
RBC # FLD: 13.2 %
RED BLOOD CELLS URINE: 8 /HPF
SODIUM SERPL-SCNC: 143 MMOL/L
SPECIFIC GRAVITY URINE: 1.01
SQUAMOUS EPITHELIAL CELLS: 1 /HPF
TIBC SERPL-MCNC: 223 UG/DL
UIBC SERPL-MCNC: 184 UG/DL
UROBILINOGEN URINE: NORMAL MG/DL
WBC # FLD AUTO: 5.21 K/UL
WHITE BLOOD CELLS URINE: 1 /HPF

## 2017-07-12 LAB
ALBUMIN SERPL ELPH-MCNC: 4.7 G/DL
ANION GAP SERPL CALC-SCNC: 15 MMOL/L
BASOPHILS # BLD AUTO: 0.07 K/UL
BASOPHILS NFR BLD AUTO: 1.2 %
BUN SERPL-MCNC: 22 MG/DL
CALCIUM SERPL-MCNC: 10.3 MG/DL
CHLORIDE SERPL-SCNC: 100 MMOL/L
CO2 SERPL-SCNC: 24 MMOL/L
CREAT SERPL-MCNC: 1.17 MG/DL
EOSINOPHIL # BLD AUTO: 0.11 K/UL
EOSINOPHIL NFR BLD AUTO: 1.9 %
GLUCOSE SERPL-MCNC: 88 MG/DL
HCT VFR BLD CALC: 32.8 %
HGB BLD-MCNC: 10.6 G/DL
IMM GRANULOCYTES NFR BLD AUTO: 0.2 %
LYMPHOCYTES # BLD AUTO: 2.15 K/UL
LYMPHOCYTES NFR BLD AUTO: 36.2 %
MAN DIFF?: NORMAL
MCHC RBC-ENTMCNC: 29.7 PG
MCHC RBC-ENTMCNC: 32.3 GM/DL
MCV RBC AUTO: 91.9 FL
MONOCYTES # BLD AUTO: 0.44 K/UL
MONOCYTES NFR BLD AUTO: 7.4 %
NEUTROPHILS # BLD AUTO: 3.16 K/UL
NEUTROPHILS NFR BLD AUTO: 53.1 %
PHOSPHATE SERPL-MCNC: 3.4 MG/DL
PLATELET # BLD AUTO: 397 K/UL
POTASSIUM SERPL-SCNC: 5.3 MMOL/L
RBC # BLD: 3.57 M/UL
RBC # FLD: 13.3 %
SODIUM SERPL-SCNC: 139 MMOL/L
WBC # FLD AUTO: 5.94 K/UL

## 2017-07-12 RX ORDER — TORSEMIDE 10 MG/1
10 TABLET ORAL DAILY
Qty: 30 | Refills: 4 | Status: DISCONTINUED | COMMUNITY
Start: 2017-06-28 | End: 2017-07-12

## 2017-07-28 DIAGNOSIS — N17.9 ACUTE KIDNEY FAILURE, UNSPECIFIED: ICD-10-CM

## 2017-07-28 DIAGNOSIS — E87.1 HYPO-OSMOLALITY AND HYPONATREMIA: ICD-10-CM

## 2017-09-28 LAB
ALBUMIN SERPL ELPH-MCNC: 4.6 G/DL
ANION GAP SERPL CALC-SCNC: 16 MMOL/L
APPEARANCE: CLEAR
BACTERIA: NEGATIVE
BASOPHILS # BLD AUTO: 0.04 K/UL
BASOPHILS NFR BLD AUTO: 0.6 %
BILIRUBIN URINE: NEGATIVE
BLOOD URINE: NEGATIVE
BUN SERPL-MCNC: 16 MG/DL
C3 SERPL-MCNC: 71 MG/DL
C4 SERPL-MCNC: 24 MG/DL
CALCIUM SERPL-MCNC: 10 MG/DL
CHLORIDE SERPL-SCNC: 102 MMOL/L
CO2 SERPL-SCNC: 24 MMOL/L
COLOR: YELLOW
CREAT SERPL-MCNC: 1.13 MG/DL
CREAT SPEC-SCNC: 26 MG/DL
CREAT/PROT UR: NORMAL
EOSINOPHIL # BLD AUTO: 0.09 K/UL
EOSINOPHIL NFR BLD AUTO: 1.4 %
GLUCOSE QUALITATIVE U: NORMAL MG/DL
GLUCOSE SERPL-MCNC: 89 MG/DL
HCT VFR BLD CALC: 38.3 %
HGB BLD-MCNC: 12.6 G/DL
IMM GRANULOCYTES NFR BLD AUTO: 0.2 %
KETONES URINE: NEGATIVE
LEUKOCYTE ESTERASE URINE: NEGATIVE
LYMPHOCYTES # BLD AUTO: 2.53 K/UL
LYMPHOCYTES NFR BLD AUTO: 38.4 %
MAN DIFF?: NORMAL
MCHC RBC-ENTMCNC: 30.3 PG
MCHC RBC-ENTMCNC: 32.9 GM/DL
MCV RBC AUTO: 92.1 FL
MICROSCOPIC-UA: NORMAL
MONOCYTES # BLD AUTO: 0.3 K/UL
MONOCYTES NFR BLD AUTO: 4.6 %
NEUTROPHILS # BLD AUTO: 3.62 K/UL
NEUTROPHILS NFR BLD AUTO: 54.8 %
NITRITE URINE: NEGATIVE
PH URINE: 7.5
PHOSPHATE SERPL-MCNC: 3.4 MG/DL
PLATELET # BLD AUTO: 253 K/UL
POTASSIUM SERPL-SCNC: 4.4 MMOL/L
PROT UR-MCNC: <4 MG/DL
PROTEIN URINE: NEGATIVE MG/DL
RBC # BLD: 4.16 M/UL
RBC # FLD: 12.8 %
RED BLOOD CELLS URINE: 0 /HPF
SODIUM SERPL-SCNC: 142 MMOL/L
SPECIFIC GRAVITY URINE: 1.01
SQUAMOUS EPITHELIAL CELLS: 0 /HPF
UROBILINOGEN URINE: NORMAL MG/DL
WBC # FLD AUTO: 6.59 K/UL
WHITE BLOOD CELLS URINE: 0 /HPF

## 2020-12-04 NOTE — PROGRESS NOTE ADULT - PROBLEM SELECTOR PLAN 1
Pt ambulated to restroom.   Will recheck lactic acid after ivf per erp's order.   worsened creatinine after IV hydration  possible cardio-renal syndrome  -would give trial of lasix 40mg IV x 1 worsened creatinine after IV hydration and lasix

## 2021-02-02 NOTE — H&P ADULT - NSHPREVIEWOFSYSTEMS_GEN_ALL_CORE
Review of Systems:   CONSTITUTION: fever, weight gain, malaise  EYES: No eye pain, visual disturbances, or discharge  ENMT:  hoarse voice, No difficulty hearing, tinnitus, vertigo; No sinus or throat pain  NECK: No pain or stiffness  BREASTS: No pain, masses, or nipple discharge  RESPIRATORY: No cough, wheezing, chills or hemoptysis; No shortness of breath  CARDIOVASCULAR: No chest pain, palpitations, dizziness, or leg swelling  GASTROINTESTINAL: nasuea and vomiting,  No abdominal or epigastric pain, or hematemesis; No diarrhea or constipation. No melena or hematochezia.  GENITOURINARY: decrease urine output. No dysuria, frequency, hematuria, or incontinence  NEUROLOGICAL: No headaches, memory loss, loss of strength, numbness, or tremors  SKIN: No itching, burning, rashes, or lesions   LYMPH NODES: No enlarged glands  ENDOCRINE: No heat or cold intolerance; No hair loss  MUSCULOSKELETAL: No joint pain or swelling; No muscle, back, or extremity pain  PSYCHIATRIC: No depression, anxiety, mood swings, or difficulty sleeping  HEME/LYMPH: No easy bruising, or bleeding gums  ALLERY AND IMMUNOLOGIC: No hives or eczema
English

## 2021-03-10 NOTE — PROGRESS NOTE ADULT - ATTENDING COMMENTS
Specimens verified per policy.   pt improving. edema less.   can given lasix 40mg daily po.  monitor cr and UOP pt improving. edema less. I doubt that there is a GN or NS process. minimal proteinuria. improvement of ETHEL without any immunosuppressive therapy.  low compliments may be due to non-renal inflammatory process ?parvo related   hyponatremia; improving but still with edema can given lasix 40mg daily po.  monitor cr and UOP